# Patient Record
Sex: FEMALE | Race: WHITE | NOT HISPANIC OR LATINO | URBAN - METROPOLITAN AREA
[De-identification: names, ages, dates, MRNs, and addresses within clinical notes are randomized per-mention and may not be internally consistent; named-entity substitution may affect disease eponyms.]

---

## 2017-06-20 ENCOUNTER — INPATIENT (INPATIENT)
Facility: HOSPITAL | Age: 82
LOS: 5 days | Discharge: OTHER INSTITUTION | DRG: 539 | End: 2017-06-26
Attending: FAMILY MEDICINE | Admitting: FAMILY MEDICINE
Payer: MEDICARE

## 2017-06-20 VITALS
WEIGHT: 134.04 LBS | HEIGHT: 64 IN | HEART RATE: 91 BPM | OXYGEN SATURATION: 99 % | TEMPERATURE: 98 F | RESPIRATION RATE: 16 BRPM | DIASTOLIC BLOOD PRESSURE: 65 MMHG | SYSTOLIC BLOOD PRESSURE: 104 MMHG

## 2017-06-20 DIAGNOSIS — E11.9 TYPE 2 DIABETES MELLITUS WITHOUT COMPLICATIONS: ICD-10-CM

## 2017-06-20 DIAGNOSIS — Z90.721 ACQUIRED ABSENCE OF OVARIES, UNILATERAL: Chronic | ICD-10-CM

## 2017-06-20 DIAGNOSIS — Z96.7 PRESENCE OF OTHER BONE AND TENDON IMPLANTS: Chronic | ICD-10-CM

## 2017-06-20 DIAGNOSIS — N30.90 CYSTITIS, UNSPECIFIED WITHOUT HEMATURIA: ICD-10-CM

## 2017-06-20 DIAGNOSIS — G35 MULTIPLE SCLEROSIS: ICD-10-CM

## 2017-06-20 DIAGNOSIS — I10 ESSENTIAL (PRIMARY) HYPERTENSION: ICD-10-CM

## 2017-06-20 DIAGNOSIS — L98.499 NON-PRESSURE CHRONIC ULCER OF SKIN OF OTHER SITES WITH UNSPECIFIED SEVERITY: ICD-10-CM

## 2017-06-20 DIAGNOSIS — Z29.9 ENCOUNTER FOR PROPHYLACTIC MEASURES, UNSPECIFIED: ICD-10-CM

## 2017-06-20 DIAGNOSIS — E78.00 PURE HYPERCHOLESTEROLEMIA, UNSPECIFIED: ICD-10-CM

## 2017-06-20 DIAGNOSIS — E88.09 OTHER DISORDERS OF PLASMA-PROTEIN METABOLISM, NOT ELSEWHERE CLASSIFIED: ICD-10-CM

## 2017-06-20 DIAGNOSIS — F32.9 MAJOR DEPRESSIVE DISORDER, SINGLE EPISODE, UNSPECIFIED: ICD-10-CM

## 2017-06-20 LAB
ALBUMIN SERPL ELPH-MCNC: 2.8 G/DL — LOW (ref 3.3–5)
ALP SERPL-CCNC: 51 U/L — SIGNIFICANT CHANGE UP (ref 40–120)
ALT FLD-CCNC: 12 U/L — SIGNIFICANT CHANGE UP (ref 12–78)
AMYLASE P1 CFR SERPL: 49 U/L — SIGNIFICANT CHANGE UP (ref 25–115)
ANION GAP SERPL CALC-SCNC: 6 MMOL/L — SIGNIFICANT CHANGE UP (ref 5–17)
APPEARANCE UR: ABNORMAL
APTT BLD: 33.3 SEC — SIGNIFICANT CHANGE UP (ref 27.5–37.4)
AST SERPL-CCNC: 14 U/L — LOW (ref 15–37)
BASOPHILS # BLD AUTO: 0.1 K/UL — SIGNIFICANT CHANGE UP (ref 0–0.2)
BASOPHILS NFR BLD AUTO: 1.2 % — SIGNIFICANT CHANGE UP (ref 0–2)
BILIRUB SERPL-MCNC: 0.1 MG/DL — LOW (ref 0.2–1.2)
BILIRUB UR-MCNC: ABNORMAL
BUN SERPL-MCNC: 47 MG/DL — HIGH (ref 7–23)
CALCIUM SERPL-MCNC: 8.9 MG/DL — SIGNIFICANT CHANGE UP (ref 8.5–10.1)
CHLORIDE SERPL-SCNC: 109 MMOL/L — HIGH (ref 96–108)
CO2 SERPL-SCNC: 27 MMOL/L — SIGNIFICANT CHANGE UP (ref 22–31)
COLOR SPEC: YELLOW — SIGNIFICANT CHANGE UP
CREAT SERPL-MCNC: 0.85 MG/DL — SIGNIFICANT CHANGE UP (ref 0.5–1.3)
DIFF PNL FLD: ABNORMAL
EOSINOPHIL # BLD AUTO: 0.3 K/UL — SIGNIFICANT CHANGE UP (ref 0–0.5)
EOSINOPHIL NFR BLD AUTO: 3.7 % — SIGNIFICANT CHANGE UP (ref 0–6)
GLUCOSE SERPL-MCNC: 104 MG/DL — HIGH (ref 70–99)
GLUCOSE UR QL: NEGATIVE — SIGNIFICANT CHANGE UP
HCT VFR BLD CALC: 25.8 % — LOW (ref 34.5–45)
HGB BLD-MCNC: 8 G/DL — LOW (ref 11.5–15.5)
INR BLD: 1.08 RATIO — SIGNIFICANT CHANGE UP (ref 0.88–1.16)
KETONES UR-MCNC: NEGATIVE — SIGNIFICANT CHANGE UP
LACTATE SERPL-SCNC: 1 MMOL/L — SIGNIFICANT CHANGE UP (ref 0.7–2)
LEUKOCYTE ESTERASE UR-ACNC: ABNORMAL
LIDOCAIN IGE QN: 194 U/L — SIGNIFICANT CHANGE UP (ref 73–393)
LYMPHOCYTES # BLD AUTO: 1.3 K/UL — SIGNIFICANT CHANGE UP (ref 1–3.3)
LYMPHOCYTES # BLD AUTO: 14 % — SIGNIFICANT CHANGE UP (ref 13–44)
MCHC RBC-ENTMCNC: 31 GM/DL — LOW (ref 32–36)
MCHC RBC-ENTMCNC: 33.2 PG — SIGNIFICANT CHANGE UP (ref 27–34)
MCV RBC AUTO: 107.3 FL — HIGH (ref 80–100)
MONOCYTES # BLD AUTO: 0.1 K/UL — SIGNIFICANT CHANGE UP (ref 0–0.9)
MONOCYTES NFR BLD AUTO: 0.9 % — LOW (ref 1–9)
NEUTROPHILS # BLD AUTO: 7.2 K/UL — SIGNIFICANT CHANGE UP (ref 1.8–7.4)
NEUTROPHILS NFR BLD AUTO: 80.2 % — HIGH (ref 43–77)
NITRITE UR-MCNC: NEGATIVE — SIGNIFICANT CHANGE UP
PH UR: 9 — HIGH (ref 5–8)
PLATELET # BLD AUTO: 763 K/UL — HIGH (ref 150–400)
POTASSIUM SERPL-MCNC: 4.5 MMOL/L — SIGNIFICANT CHANGE UP (ref 3.5–5.3)
POTASSIUM SERPL-SCNC: 4.5 MMOL/L — SIGNIFICANT CHANGE UP (ref 3.5–5.3)
PROCALCITONIN SERPL-MCNC: 0.14 NG/ML — HIGH (ref 0–0.04)
PROT SERPL-MCNC: 6.5 G/DL — SIGNIFICANT CHANGE UP (ref 6–8.3)
PROT UR-MCNC: 75 MG/DL
PROTHROM AB SERPL-ACNC: 11.8 SEC — SIGNIFICANT CHANGE UP (ref 9.8–12.7)
RBC # BLD: 2.41 M/UL — LOW (ref 3.8–5.2)
RBC # FLD: 15.7 % — HIGH (ref 10.3–14.5)
SODIUM SERPL-SCNC: 142 MMOL/L — SIGNIFICANT CHANGE UP (ref 135–145)
SP GR SPEC: 1.01 — SIGNIFICANT CHANGE UP (ref 1.01–1.02)
UROBILINOGEN FLD QL: NEGATIVE — SIGNIFICANT CHANGE UP
WBC # BLD: 9 K/UL — SIGNIFICANT CHANGE UP (ref 3.8–10.5)
WBC # FLD AUTO: 9 K/UL — SIGNIFICANT CHANGE UP (ref 3.8–10.5)

## 2017-06-20 PROCEDURE — 71010: CPT | Mod: 26

## 2017-06-20 PROCEDURE — 99223 1ST HOSP IP/OBS HIGH 75: CPT | Mod: AI,GC

## 2017-06-20 PROCEDURE — 70450 CT HEAD/BRAIN W/O DYE: CPT | Mod: 26

## 2017-06-20 PROCEDURE — 74177 CT ABD & PELVIS W/CONTRAST: CPT | Mod: 26

## 2017-06-20 PROCEDURE — 99285 EMERGENCY DEPT VISIT HI MDM: CPT

## 2017-06-20 RX ORDER — VANCOMYCIN HCL 1 G
1000 VIAL (EA) INTRAVENOUS EVERY 12 HOURS
Qty: 0 | Refills: 0 | Status: DISCONTINUED | OUTPATIENT
Start: 2017-06-20 | End: 2017-06-21

## 2017-06-20 RX ORDER — SENNA PLUS 8.6 MG/1
2 TABLET ORAL AT BEDTIME
Qty: 0 | Refills: 0 | Status: DISCONTINUED | OUTPATIENT
Start: 2017-06-20 | End: 2017-06-26

## 2017-06-20 RX ORDER — PIPERACILLIN AND TAZOBACTAM 4; .5 G/20ML; G/20ML
3.38 INJECTION, POWDER, LYOPHILIZED, FOR SOLUTION INTRAVENOUS ONCE
Qty: 0 | Refills: 0 | Status: DISCONTINUED | OUTPATIENT
Start: 2017-06-20 | End: 2017-06-20

## 2017-06-20 RX ORDER — VENLAFAXINE HCL 75 MG
75 CAPSULE, EXT RELEASE 24 HR ORAL AT BEDTIME
Qty: 0 | Refills: 0 | Status: DISCONTINUED | OUTPATIENT
Start: 2017-06-20 | End: 2017-06-26

## 2017-06-20 RX ORDER — ENOXAPARIN SODIUM 100 MG/ML
40 INJECTION SUBCUTANEOUS DAILY
Qty: 0 | Refills: 0 | Status: DISCONTINUED | OUTPATIENT
Start: 2017-06-20 | End: 2017-06-26

## 2017-06-20 RX ORDER — SODIUM CHLORIDE 9 MG/ML
1000 INJECTION INTRAMUSCULAR; INTRAVENOUS; SUBCUTANEOUS
Qty: 0 | Refills: 0 | Status: DISCONTINUED | OUTPATIENT
Start: 2017-06-20 | End: 2017-06-23

## 2017-06-20 RX ORDER — METFORMIN HYDROCHLORIDE 850 MG/1
500 TABLET ORAL
Qty: 0 | Refills: 0 | Status: DISCONTINUED | OUTPATIENT
Start: 2017-06-20 | End: 2017-06-21

## 2017-06-20 RX ORDER — ACETAMINOPHEN 500 MG
650 TABLET ORAL EVERY 6 HOURS
Qty: 0 | Refills: 0 | Status: DISCONTINUED | OUTPATIENT
Start: 2017-06-20 | End: 2017-06-26

## 2017-06-20 RX ORDER — MORPHINE SULFATE 50 MG/1
2 CAPSULE, EXTENDED RELEASE ORAL EVERY 4 HOURS
Qty: 0 | Refills: 0 | Status: DISCONTINUED | OUTPATIENT
Start: 2017-06-20 | End: 2017-06-21

## 2017-06-20 RX ORDER — SODIUM CHLORIDE 9 MG/ML
1000 INJECTION INTRAMUSCULAR; INTRAVENOUS; SUBCUTANEOUS ONCE
Qty: 0 | Refills: 0 | Status: COMPLETED | OUTPATIENT
Start: 2017-06-20 | End: 2017-06-20

## 2017-06-20 RX ORDER — METFORMIN HYDROCHLORIDE 850 MG/1
500 TABLET ORAL EVERY 12 HOURS
Qty: 0 | Refills: 0 | Status: DISCONTINUED | OUTPATIENT
Start: 2017-06-20 | End: 2017-06-20

## 2017-06-20 RX ORDER — PIPERACILLIN AND TAZOBACTAM 4; .5 G/20ML; G/20ML
3.38 INJECTION, POWDER, LYOPHILIZED, FOR SOLUTION INTRAVENOUS EVERY 8 HOURS
Qty: 0 | Refills: 0 | Status: DISCONTINUED | OUTPATIENT
Start: 2017-06-21 | End: 2017-06-22

## 2017-06-20 RX ORDER — POLYETHYLENE GLYCOL 3350 17 G/17G
17 POWDER, FOR SOLUTION ORAL EVERY 24 HOURS
Qty: 0 | Refills: 0 | Status: DISCONTINUED | OUTPATIENT
Start: 2017-06-20 | End: 2017-06-26

## 2017-06-20 RX ORDER — METOPROLOL TARTRATE 50 MG
50 TABLET ORAL DAILY
Qty: 0 | Refills: 0 | Status: DISCONTINUED | OUTPATIENT
Start: 2017-06-20 | End: 2017-06-22

## 2017-06-20 RX ORDER — PIPERACILLIN AND TAZOBACTAM 4; .5 G/20ML; G/20ML
3.38 INJECTION, POWDER, LYOPHILIZED, FOR SOLUTION INTRAVENOUS ONCE
Qty: 0 | Refills: 0 | Status: COMPLETED | OUTPATIENT
Start: 2017-06-20 | End: 2017-06-20

## 2017-06-20 RX ORDER — VANCOMYCIN HCL 1 G
1000 VIAL (EA) INTRAVENOUS ONCE
Qty: 0 | Refills: 0 | Status: COMPLETED | OUTPATIENT
Start: 2017-06-20 | End: 2017-06-20

## 2017-06-20 RX ORDER — SODIUM CHLORIDE 9 MG/ML
3 INJECTION INTRAMUSCULAR; INTRAVENOUS; SUBCUTANEOUS ONCE
Qty: 0 | Refills: 0 | Status: COMPLETED | OUTPATIENT
Start: 2017-06-20 | End: 2017-06-20

## 2017-06-20 RX ORDER — ALPRAZOLAM 0.25 MG
0.5 TABLET ORAL ONCE
Qty: 0 | Refills: 0 | Status: DISCONTINUED | OUTPATIENT
Start: 2017-06-20 | End: 2017-06-21

## 2017-06-20 RX ORDER — HYDROMORPHONE HYDROCHLORIDE 2 MG/ML
1 INJECTION INTRAMUSCULAR; INTRAVENOUS; SUBCUTANEOUS EVERY 4 HOURS
Qty: 0 | Refills: 0 | Status: DISCONTINUED | OUTPATIENT
Start: 2017-06-20 | End: 2017-06-21

## 2017-06-20 RX ADMIN — SODIUM CHLORIDE 50 MILLILITER(S): 9 INJECTION INTRAMUSCULAR; INTRAVENOUS; SUBCUTANEOUS at 22:45

## 2017-06-20 RX ADMIN — Medication 75 MILLIGRAM(S): at 21:04

## 2017-06-20 RX ADMIN — Medication 250 MILLIGRAM(S): at 20:59

## 2017-06-20 RX ADMIN — MORPHINE SULFATE 2 MILLIGRAM(S): 50 CAPSULE, EXTENDED RELEASE ORAL at 22:49

## 2017-06-20 RX ADMIN — MORPHINE SULFATE 2 MILLIGRAM(S): 50 CAPSULE, EXTENDED RELEASE ORAL at 23:49

## 2017-06-20 RX ADMIN — SODIUM CHLORIDE 3 MILLILITER(S): 9 INJECTION INTRAMUSCULAR; INTRAVENOUS; SUBCUTANEOUS at 18:56

## 2017-06-20 RX ADMIN — Medication 50 MILLIGRAM(S): at 21:04

## 2017-06-20 RX ADMIN — SODIUM CHLORIDE 1000 MILLILITER(S): 9 INJECTION INTRAMUSCULAR; INTRAVENOUS; SUBCUTANEOUS at 19:02

## 2017-06-20 RX ADMIN — POLYETHYLENE GLYCOL 3350 17 GRAM(S): 17 POWDER, FOR SOLUTION ORAL at 21:04

## 2017-06-20 RX ADMIN — PIPERACILLIN AND TAZOBACTAM 200 GRAM(S): 4; .5 INJECTION, POWDER, LYOPHILIZED, FOR SOLUTION INTRAVENOUS at 19:02

## 2017-06-20 NOTE — H&P ADULT - RS GEN PE MLT RESP DETAILS PC
good air movement/no rales/clear to auscultation bilaterally/no wheezes/no rhonchi/normal/airway patent/breath sounds equal

## 2017-06-20 NOTE — H&P ADULT - ATTENDING COMMENTS
Pt was discharged from hospice care, however may need to re-eval for palliative care. Pt may require longterm IV abx if osteomyelitis confirmed.

## 2017-06-20 NOTE — ED ADULT NURSE NOTE - OBJECTIVE STATEMENT
pt states "I hurt all over" pt lethargic, spouse states this started last night. pt also with back pain.  pt with sacral wound with purulent drainage.

## 2017-06-20 NOTE — ED ADULT NURSE REASSESSMENT NOTE - NS ED NURSE REASSESS COMMENT FT1
Hill catheter change, F16 Hill catheter inserted connected to urine bag draining a cloudy yellow output. Patient tolerated procedure well.

## 2017-06-20 NOTE — ED PROVIDER NOTE - OBJECTIVE STATEMENT
Pt is a 85 yo female who presents to the ED with a cc of back pain.  Pt is lethargic at bedside and cannot provide history.  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  She has been non-ambulatory since 2003.  She was on home hospice at some point but was released from this program because her health appeared to improve.   receives services through another program at home.  He also reports that pt has a chronic wound to her sacrum and that visiting nurses come 3 times a week to provide dressing changes.  Last night pt began to c/o low back pain.  The pain continued throughout the night.  Early this morning around 7 am he gave her a Xanax which appeared to calm her down and she slept.  Pt then awoke again late morning early afternoon with back pain.  Her  again gave a Xanax and 2 extra strength Tylenol.  She slept for a few hours and then awoke again with severe pain.  EMS was called and pt was brought to the ED.

## 2017-06-20 NOTE — ED PROVIDER NOTE - FAMILY HISTORY
<<-----Click on this checkbox to enter Family History Family history of cancer in mother, unknown type     Family history of heart attack     Sibling  Still living? No  Family history of breast cancer in sister, Age at diagnosis: Age Unknown

## 2017-06-20 NOTE — ED PROVIDER NOTE - NEUROLOGICAL, MLM
Awake and lethargic not answering questions at this time,  atrophy noted to UE and LE with muscle wasting

## 2017-06-20 NOTE — ED PROVIDER NOTE - PSH
H/O abdominal hysterectomy    H/O oophorectomy    S/P ORIF (open reduction internal fixation) fracture  right lower leg

## 2017-06-20 NOTE — H&P ADULT - ASSESSMENT
87 y/o f pmhx depression, DM, HLD, HTN, MS, ulcer low back/pelvis presented to the ED with severe back pain admitted with sacral osteomyelitis.

## 2017-06-20 NOTE — ED PROVIDER NOTE - PMH
Depression    Diabetes mellitus    Hypercholesteremia    Hypertension    MS (multiple sclerosis)    Open wound of lower back and pelvis

## 2017-06-20 NOTE — H&P ADULT - SKIN COMMENTS
large sacral ulcer- unstagable with brown purulent discharge, raised red outer rim. L. ilium stage 1 ulcer.

## 2017-06-20 NOTE — H&P ADULT - HISTORY OF PRESENT ILLNESS
85 y/o f pmhx depression, DM, HLD, HTN, MS, ulcer low back/pelvis presented to the ED with severe back pain. Pt is lethargic and not able to speak,  at bedside.       Last night pt began to c/o low back pain. At 7am pts  gave her a Xanax which helped her to sleep.  Pt then awoke again late morning early afternoon with back pain.  Her  again gave a Xanax and 2 extra strength Tylenol.  She slept for a few hours and then awoke again with severe pain.  EMS was called and pt was brought to the ED. 85 y/o f pmhx depression, DM, HLD, HTN, MS, ulcer low back/pelvis presented to the ED with severe back pain. Pt is lethargic and not able to speak,  at bedside.       Last night pt began to c/o low back pain. At 7am pts  gave her a Xanax which helped her to sleep.  Pt then awoke again late morning early afternoon with back pain.  Her  again gave a Xanax and 2 extra strength Tylenol.  She slept for a few hours and then awoke again with severe pain.  EMS was called and pt was brought to the ED.    In the ED:  H/H: 8/25.8, BUN 47, albumin 2.8, procalcitonin .14, Lactate 1, UA: turbid, moderate LE, 11-25 WBC, pH 9, 3-5 RBC, TNTC bacteria.     CT head: No acute intracranial pathology.  CXR: no active disease    Pt given 1x dose IV Zosyn, 1L bolus NS, Xanax, 87 y/o f pmhx depression, DM, HLD, HTN, MS, ulcer low back/pelvis presented to the ED with severe back pain. Pt is lethargic and not able to speak,  at bedside.   awoke at midnight c/o back ache,  gave Xanax to pt and she was able to sleep the remainder of the night. When she woke up in the morning she began to complain of the pain again and was unable to find a comfortable position. He gave her another Xanax but this did not seem to help the pain this time.  states she has a "very bad bed wound" since January 2016. She has been seen by wound care and has had debridement of the wounds. She has also been in several nursing facilities until recently, pt was on hospice until 1 month ago.     In the ED:  H/H: 8/25.8, BUN 47, albumin 2.8, procalcitonin .14, Lactate 1, UA: turbid, moderate LE, 11-25 WBC, pH 9, 3-5 RBC, TNTC bacteria.     CT head: No acute intracranial pathology.  CXR: no active disease  CT abdomen/pelvis: Bladder appearance consistent with cystitis in the appropriate clinical setting. Correlate with symptomatology and urinalysis. Deep sacral ulcer possibly extending to bone. Osteomyelitis not excluded. Correlate with bone scan or MR.    Pt given 1x dose IV Zosyn, 1L bolus NS, Xanax, 85 y/o f pmhx depression, DM, HLD, HTN, MS, ulcer low back/pelvis presented to the ED with severe back pain. Pt is lethargic and not able to speak,  at bedside.   awoke at midnight c/o back ache,  gave Xanax to pt and she was able to sleep the remainder of the night. When she woke up in the morning she began to complain of the pain again and was unable to find a comfortable position. He gave her another Xanax but this did not seem to help the pain this time.  states she has a "very bad bed wound" since January 2016. She has been seen by wound care and has had debridement of the wounds. She has also been in several nursing facilities until recently, pt was on hospice until 1 month ago. Pt. did not want to answer any questions at time of exam. History obtained from .    In the ED:  H/H: 8/25.8, BUN 47, albumin 2.8, procalcitonin .14, Lactate 1, UA: turbid, moderate LE, 11-25 WBC, pH 9, 3-5 RBC, TNTC bacteria.     CT head: No acute intracranial pathology.  CXR: no active disease  CT abdomen/pelvis: Bladder appearance consistent with cystitis in the appropriate clinical setting. Correlate with symptomatology and urinalysis. Deep sacral ulcer possibly extending to bone. Osteomyelitis not excluded. Correlate with bone scan or MR.    Pt given 1x dose IV Zosyn, 1L bolus NS, Xanax,

## 2017-06-20 NOTE — ED PROVIDER NOTE - CARE PLAN
Principal Discharge DX:	Sacral ulcer, with unspecified severity  Instructions for follow-up, activity and diet:	admit  Secondary Diagnosis:	UTI (urinary tract infection)  Secondary Diagnosis:	Diabetes mellitus

## 2017-06-21 DIAGNOSIS — S31.000A UNSPECIFIED OPEN WOUND OF LOWER BACK AND PELVIS WITHOUT PENETRATION INTO RETROPERITONEUM, INITIAL ENCOUNTER: ICD-10-CM

## 2017-06-21 DIAGNOSIS — L98.493 NON-PRESSURE CHRONIC ULCER OF SKIN OF OTHER SITES WITH NECROSIS OF MUSCLE: ICD-10-CM

## 2017-06-21 DIAGNOSIS — D64.9 ANEMIA, UNSPECIFIED: ICD-10-CM

## 2017-06-21 LAB
ANION GAP SERPL CALC-SCNC: 6 MMOL/L — SIGNIFICANT CHANGE UP (ref 5–17)
BUN SERPL-MCNC: 41 MG/DL — HIGH (ref 7–23)
CALCIUM SERPL-MCNC: 8.3 MG/DL — LOW (ref 8.5–10.1)
CHLORIDE SERPL-SCNC: 107 MMOL/L — SIGNIFICANT CHANGE UP (ref 96–108)
CO2 SERPL-SCNC: 26 MMOL/L — SIGNIFICANT CHANGE UP (ref 22–31)
CREAT SERPL-MCNC: 0.85 MG/DL — SIGNIFICANT CHANGE UP (ref 0.5–1.3)
CULTURE RESULTS: SIGNIFICANT CHANGE UP
GLUCOSE SERPL-MCNC: 156 MG/DL — HIGH (ref 70–99)
HCT VFR BLD CALC: 23 % — LOW (ref 34.5–45)
HGB BLD-MCNC: 7.2 G/DL — LOW (ref 11.5–15.5)
MCHC RBC-ENTMCNC: 31.2 GM/DL — LOW (ref 32–36)
MCHC RBC-ENTMCNC: 33.4 PG — SIGNIFICANT CHANGE UP (ref 27–34)
MCV RBC AUTO: 107.1 FL — HIGH (ref 80–100)
PLATELET # BLD AUTO: 613 K/UL — HIGH (ref 150–400)
POTASSIUM SERPL-MCNC: 4.6 MMOL/L — SIGNIFICANT CHANGE UP (ref 3.5–5.3)
POTASSIUM SERPL-SCNC: 4.6 MMOL/L — SIGNIFICANT CHANGE UP (ref 3.5–5.3)
RBC # BLD: 2.14 M/UL — LOW (ref 3.8–5.2)
RBC # FLD: 15.6 % — HIGH (ref 10.3–14.5)
SODIUM SERPL-SCNC: 139 MMOL/L — SIGNIFICANT CHANGE UP (ref 135–145)
SPECIMEN SOURCE: SIGNIFICANT CHANGE UP
WBC # BLD: 10.8 K/UL — HIGH (ref 3.8–10.5)
WBC # FLD AUTO: 10.8 K/UL — HIGH (ref 3.8–10.5)

## 2017-06-21 PROCEDURE — 93010 ELECTROCARDIOGRAM REPORT: CPT

## 2017-06-21 PROCEDURE — 99233 SBSQ HOSP IP/OBS HIGH 50: CPT | Mod: GC

## 2017-06-21 RX ORDER — ALPRAZOLAM 0.25 MG
0.5 TABLET ORAL ONCE
Qty: 0 | Refills: 0 | Status: DISCONTINUED | OUTPATIENT
Start: 2017-06-21 | End: 2017-06-22

## 2017-06-21 RX ORDER — MORPHINE SULFATE 50 MG/1
2 CAPSULE, EXTENDED RELEASE ORAL EVERY 4 HOURS
Qty: 0 | Refills: 0 | Status: DISCONTINUED | OUTPATIENT
Start: 2017-06-21 | End: 2017-06-23

## 2017-06-21 RX ORDER — VANCOMYCIN HCL 1 G
1000 VIAL (EA) INTRAVENOUS EVERY 24 HOURS
Qty: 0 | Refills: 0 | Status: DISCONTINUED | OUTPATIENT
Start: 2017-06-22 | End: 2017-06-22

## 2017-06-21 RX ADMIN — HYDROMORPHONE HYDROCHLORIDE 1 MILLIGRAM(S): 2 INJECTION INTRAMUSCULAR; INTRAVENOUS; SUBCUTANEOUS at 02:58

## 2017-06-21 RX ADMIN — HYDROMORPHONE HYDROCHLORIDE 1 MILLIGRAM(S): 2 INJECTION INTRAMUSCULAR; INTRAVENOUS; SUBCUTANEOUS at 03:58

## 2017-06-21 RX ADMIN — MORPHINE SULFATE 2 MILLIGRAM(S): 50 CAPSULE, EXTENDED RELEASE ORAL at 22:58

## 2017-06-21 RX ADMIN — PIPERACILLIN AND TAZOBACTAM 25 GRAM(S): 4; .5 INJECTION, POWDER, LYOPHILIZED, FOR SOLUTION INTRAVENOUS at 18:59

## 2017-06-21 RX ADMIN — PIPERACILLIN AND TAZOBACTAM 25 GRAM(S): 4; .5 INJECTION, POWDER, LYOPHILIZED, FOR SOLUTION INTRAVENOUS at 10:52

## 2017-06-21 RX ADMIN — MORPHINE SULFATE 2 MILLIGRAM(S): 50 CAPSULE, EXTENDED RELEASE ORAL at 17:39

## 2017-06-21 RX ADMIN — ENOXAPARIN SODIUM 40 MILLIGRAM(S): 100 INJECTION SUBCUTANEOUS at 11:12

## 2017-06-21 RX ADMIN — MORPHINE SULFATE 2 MILLIGRAM(S): 50 CAPSULE, EXTENDED RELEASE ORAL at 08:34

## 2017-06-21 RX ADMIN — MORPHINE SULFATE 2 MILLIGRAM(S): 50 CAPSULE, EXTENDED RELEASE ORAL at 18:09

## 2017-06-21 RX ADMIN — MORPHINE SULFATE 2 MILLIGRAM(S): 50 CAPSULE, EXTENDED RELEASE ORAL at 08:04

## 2017-06-21 RX ADMIN — Medication 250 MILLIGRAM(S): at 05:14

## 2017-06-21 RX ADMIN — Medication 0.5 MILLIGRAM(S): at 08:04

## 2017-06-21 RX ADMIN — POLYETHYLENE GLYCOL 3350 17 GRAM(S): 17 POWDER, FOR SOLUTION ORAL at 19:53

## 2017-06-21 RX ADMIN — SODIUM CHLORIDE 50 MILLILITER(S): 9 INJECTION INTRAMUSCULAR; INTRAVENOUS; SUBCUTANEOUS at 19:58

## 2017-06-21 RX ADMIN — PIPERACILLIN AND TAZOBACTAM 25 GRAM(S): 4; .5 INJECTION, POWDER, LYOPHILIZED, FOR SOLUTION INTRAVENOUS at 03:18

## 2017-06-21 RX ADMIN — Medication 75 MILLIGRAM(S): at 21:58

## 2017-06-21 RX ADMIN — MORPHINE SULFATE 2 MILLIGRAM(S): 50 CAPSULE, EXTENDED RELEASE ORAL at 21:58

## 2017-06-21 NOTE — PROGRESS NOTE ADULT - SUBJECTIVE AND OBJECTIVE BOX
HPI:  85 y/o f pmhx depression, DM, HLD, HTN, MS, ulcer low back/pelvis presented to the ED with severe back pain. Pt was lethargic and not able to speak,  at bedside who stated pt awoke at midnight c/o back ache,  gave Xanax to pt and she was able to sleep the remainder of the night. When she woke up in the morning she began to complain of the pain again and was unable to find a comfortable position. He gave her another Xanax but this did not seem to help the pain this time.  states she has a "very bad bed wound" since 2016. She has been seen by wound care and has had debridement of the wounds. She has also been in several nursing facilities until recently, pt was on hospice until 1 month ago. Pt. did not want to answer any questions at time of exam. History obtained from . 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 In the ED: H/H: 8/25.8, BUN 47, albumin 2.8, procalcitonin .14, Lactate 1, UA: turbid, moderate LE, 11-25 WBC, pH 9, 3-5 RBC, TNTC bacteria. CT head: No acute intracranial pathology. CXR: no active disease. CT abdomen/pelvis: Bladder appearance consistent with cystitis in the appropriate clinical setting. Correlate with symptomatology and urinalysis. Deep sacral ulcer possibly extending to bone. Osteomyelitis not excluded. Correlate with bone scan or MR. Sore to bone, stage 4 - clinically osteomyelitis. Continue Zosyn (Day 2) and vanco (day 2) for sacral osteo.    REVIEW OF SYSTEMS:    CONSTITUTIONAL: + weakness, denies fevers or chills  EYES/ENT: No visual changes, no throat pain   RESPIRATORY: No cough, wheezing, hemoptysis; No shortness of breath  CARDIOVASCULAR: No chest pain or palpitations  GASTROINTESTINAL: No abdominal, nausea, vomiting, or hematemesis; No diarrhea or constipation. No melena or hematochezia.  GENITOURINARY: No dysuria, frequency or hematuria  NEUROLOGICAL: No dizziness, numbness, or weakness  SKIN: + pain on back where sore is, No itching, burning,  All other review of systems is negative unless indicated above.    Vital Signs Last 24 Hrs  T(C): 36.6, Max: 36.6 ( @ 09:42)  T(F): 97.8, Max: 97.8 ( @ 09:42)  HR: 78 (78 - 84)  BP: 117/69 (117/69 - 126/81)  BP(mean): --  RR: 15 (15 - 15)  SpO2: 96% (96% - 99%)  Wt(kg): --    PHYSICAL EXAM:     GENERAL: frail, cachectic female, no acute distress  HEENT: NC/AT, EOMI, neck supple, MMM  RESPIRATORY: decreased breath sounds b/l, no rhonchi, rales, or wheezing  CARDIOVASCULAR: RRR, no murmurs, gallops, rubs  ABDOMINAL: soft, non-tender, non-distended, positive bowel sounds   EXTREMITIES: no clubbing, cyanosis, or edema  NEUROLOGICAL: alert and oriented x 3, non-focal  SKIN: stage 4 pressure ulcer on sacrum with packing in place   MUSCULOSKELETAL: no gross joint deformity   - Chronic canseco    LABS:                        7.2    10.8  )-----------( 613      ( 2017 08:03 )             23.0         139  |  107  |  41<H>  ----------------------------<  156<H>  4.6   |  26  |  0.85    Ca    8.3<L>      2017 08:03    TPro  6.5  /  Alb  2.8<L>  /  TBili  0.1<L>  /  DBili  x   /  AST  14<L>  /  ALT  12  /  AlkPhos  51  06-20    PT/INR - ( 2017 17:35 )   PT: 11.8 sec;   INR: 1.08 ratio         PTT - ( 2017 17:35 )  PTT:33.3 sec  Urinalysis Basic - ( 2017 19:09 )    Color: Yellow / Appearance: Turbid / S.010 / pH: x  Gluc: x / Ketone: Negative  / Bili: Small / Urobili: Negative   Blood: x / Protein: 75 mg/dL / Nitrite: Negative   Leuk Esterase: Moderate / RBC: 3-5 /HPF / WBC 11-25   Sq Epi: x / Non Sq Epi: Occasional / Bacteria: TNTC      CAPILLARY BLOOD GLUCOSE  130 (2017 11:16)  161 (2017 08:45)      MEDICATIONS  (STANDING):  metoprolol succinate ER 50milliGRAM(s) Oral daily  venlafaxine 75milliGRAM(s) Oral at bedtime  polyethylene glycol 3350 17Gram(s) Oral every 24 hours  enoxaparin Injectable 40milliGRAM(s) SubCutaneous daily  sodium chloride 0.9%. 1000milliLiter(s) IV Continuous <Continuous>  vancomycin  IVPB 1000milliGRAM(s) IV Intermittent every 12 hours  piperacillin/tazobactam IVPB. 3.375Gram(s) IV Intermittent every 8 hours      Radiology:    CT head: No acute intracranial pathology.  CXR: no active disease  CT abdomen/pelvis: Bladder appearance consistent with cystitis in the appropriate clinical setting. Correlate with symptomatology and urinalysis. Deep sacral ulcer possibly extending to bone. Osteomyelitis not excluded. Correlate with bone scan or MR. HPI:  85 y/o f pmhx depression, DM, HLD, HTN, MS, ulcer low back/pelvis presented to the ED with severe back pain. Pt was lethargic and not able to speak,  at bedside who stated pt awoke at midnight c/o back ache,  gave Xanax to pt and she was able to sleep the remainder of the night. When she woke up in the morning she began to complain of the pain again and was unable to find a comfortable position. He gave her another Xanax but this did not seem to help the pain this time.  states she has a "very bad bed wound" since 2016. She has been seen by wound care and has had debridement of the wounds. She has also been in several nursing facilities until recently, pt was on hospice until 1 month ago. Pt. did not want to answer any questions at time of exam. History obtained from . 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 In the ED: H/H: 8/25.8, BUN 47, albumin 2.8, procalcitonin .14, Lactate 1, UA: turbid, moderate LE, 11-25 WBC, pH 9, 3-5 RBC, TNTC bacteria. CT head: No acute intracranial pathology. CXR: no active disease. CT abdomen/pelvis: Bladder appearance consistent with cystitis in the appropriate clinical setting. Correlate with symptomatology and urinalysis. Deep sacral ulcer possibly extending to bone. Osteomyelitis not excluded. Correlate with bone scan or MR. Sore to bone, stage 4 - clinically osteomyelitis. Continue Zosyn (Day 2) and vanco (day 2) for sacral osteo.    REVIEW OF SYSTEMS:    CONSTITUTIONAL: + weakness, denies fevers or chills  EYES/ENT: No visual changes, no throat pain   RESPIRATORY: No cough, wheezing, hemoptysis; No shortness of breath  CARDIOVASCULAR: No chest pain or palpitations  GASTROINTESTINAL: No abdominal, nausea, vomiting, or hematemesis; No diarrhea or constipation. No melena or hematochezia.  GENITOURINARY: No dysuria, frequency or hematuria  NEUROLOGICAL: No dizziness, numbness, or weakness  SKIN: + pain on back where sore is, No itching, burning,  All other review of systems is negative unless indicated above.    Vital Signs Last 24 Hrs  T(C): 36.6, Max: 36.6 ( @ 09:42)  T(F): 97.8, Max: 97.8 ( @ 09:42)  HR: 78 (78 - 84)  BP: 117/69 (117/69 - 126/81)  BP(mean): --  RR: 15 (15 - 15)  SpO2: 96% (96% - 99%)  Wt(kg): --    PHYSICAL EXAM:     GENERAL: frail, cachectic female, no acute distress  HEENT: NC/AT, EOMI, neck supple, MMM  RESPIRATORY: decreased breath sounds b/l, no rhonchi, rales, or wheezing  CARDIOVASCULAR: RRR, no murmurs, gallops, rubs  ABDOMINAL: soft, non-tender, non-distended, positive bowel sounds   EXTREMITIES: + 1 pitting edema in b/l legs, no clubbing, cyanosis  NEUROLOGICAL: alert and oriented x 3, non-focal  SKIN: stage 4 pressure ulcer on sacrum, bone visible  with packing in place   MUSCULOSKELETAL: no gross joint deformity   - Chronic canseco    LABS:                        7.2    10.8  )-----------( 613      ( 2017 08:03 )             23.0         139  |  107  |  41<H>  ----------------------------<  156<H>  4.6   |  26  |  0.85    Ca    8.3<L>      2017 08:03    TPro  6.5  /  Alb  2.8<L>  /  TBili  0.1<L>  /  DBili  x   /  AST  14<L>  /  ALT  12  /  AlkPhos  51  06-20    PT/INR - ( 2017 17:35 )   PT: 11.8 sec;   INR: 1.08 ratio         PTT - ( 2017 17:35 )  PTT:33.3 sec  Urinalysis Basic - ( 2017 19:09 )    Color: Yellow / Appearance: Turbid / S.010 / pH: x  Gluc: x / Ketone: Negative  / Bili: Small / Urobili: Negative   Blood: x / Protein: 75 mg/dL / Nitrite: Negative   Leuk Esterase: Moderate / RBC: 3-5 /HPF / WBC 11-25   Sq Epi: x / Non Sq Epi: Occasional / Bacteria: TNTC      CAPILLARY BLOOD GLUCOSE  130 (2017 11:16)  161 (2017 08:45)      MEDICATIONS  (STANDING):  metoprolol succinate ER 50milliGRAM(s) Oral daily  venlafaxine 75milliGRAM(s) Oral at bedtime  polyethylene glycol 3350 17Gram(s) Oral every 24 hours  enoxaparin Injectable 40milliGRAM(s) SubCutaneous daily  sodium chloride 0.9%. 1000milliLiter(s) IV Continuous <Continuous>  vancomycin  IVPB 1000milliGRAM(s) IV Intermittent every 12 hours  piperacillin/tazobactam IVPB. 3.375Gram(s) IV Intermittent every 8 hours      Radiology:    CT head: No acute intracranial pathology.  CXR: no active disease  CT abdomen/pelvis: Bladder appearance consistent with cystitis in the appropriate clinical setting. Correlate with symptomatology and urinalysis. Deep sacral ulcer possibly extending to bone. Osteomyelitis not excluded. Correlate with bone scan or MR. HPI:  87 y/o f pmhx depression, DM, HLD, HTN, MS, ulcer low back/pelvis presented to the ED with severe back pain. Pt was lethargic and not able to speak,  at bedside who stated pt awoke at midnight c/o back ache,  gave Xanax to pt and she was able to sleep the remainder of the night. When she woke up in the morning she began to complain of the pain again and was unable to find a comfortable position. He gave her another Xanax but this did not seem to help the pain this time.  states she has a "very bad bed wound" since 2016. She has been seen by wound care and has had debridement of the wounds. She has also been in several nursing facilities until recently, pt was on hospice until 1 month ago. Pt. did not want to answer any questions at time of exam. History obtained from . 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 In the ED: H/H: 8/25.8, BUN 47, albumin 2.8, procalcitonin .14, Lactate 1, UA: turbid, moderate LE, 11-25 WBC, pH 9, 3-5 RBC, TNTC bacteria. CT head: No acute intracranial pathology. CXR: no active disease. CT abdomen/pelvis: Bladder appearance consistent with cystitis in the appropriate clinical setting. Correlate with symptomatology and urinalysis. Deep sacral ulcer possibly extending to bone. Osteomyelitis not excluded. Correlate with bone scan or MRBandar Has stage IV ulcer (bone visible) - clinically osteomyelitis. Continue Zosyn (Day 2) and vanco (day 2) for ? sacral osteo.    REVIEW OF SYSTEMS:    CONSTITUTIONAL: + weakness, denies fevers or chills  EYES/ENT: No visual changes, no throat pain   RESPIRATORY: No cough, wheezing, hemoptysis; No shortness of breath  CARDIOVASCULAR: No chest pain or palpitations  GASTROINTESTINAL: No abdominal, nausea, vomiting, or hematemesis; No diarrhea or constipation. No melena or hematochezia.  GENITOURINARY: No dysuria, frequency or hematuria  NEUROLOGICAL: No dizziness, numbness, or weakness  SKIN: + pain on back where sore is, No itching, burning,  All other review of systems is negative unless indicated above.    Vital Signs Last 24 Hrs  T(C): 36.6, Max: 36.6 ( @ 09:42)  T(F): 97.8, Max: 97.8 ( @ 09:42)  HR: 78 (78 - 84)  BP: 117/69 (117/69 - 126/81)  BP(mean): --  RR: 15 (15 - 15)  SpO2: 96% (96% - 99%)  Wt(kg): --    PHYSICAL EXAM:     GENERAL: frail, cachectic female, no acute distress  HEENT: NC/AT, EOMI, neck supple, MMM  RESPIRATORY: decreased breath sounds b/l, no rhonchi, rales, or wheezing  CARDIOVASCULAR: RRR, no murmurs, gallops, rubs  ABDOMINAL: soft, non-tender, non-distended, positive bowel sounds   EXTREMITIES: + 1 pitting edema in b/l legs, no clubbing, cyanosis  NEUROLOGICAL: alert and oriented x 3, non-focal  SKIN: stage 4 pressure ulcer on sacrum, bone visible  with packing in place   MUSCULOSKELETAL: no gross joint deformity   - Chronic canseco    LABS:                        7.2    10.8  )-----------( 613      ( 2017 08:03 )             23.0         139  |  107  |  41<H>  ----------------------------<  156<H>  4.6   |  26  |  0.85    Ca    8.3<L>      2017 08:03    TPro  6.5  /  Alb  2.8<L>  /  TBili  0.1<L>  /  DBili  x   /  AST  14<L>  /  ALT  12  /  AlkPhos  51  06-20    PT/INR - ( 2017 17:35 )   PT: 11.8 sec;   INR: 1.08 ratio         PTT - ( 2017 17:35 )  PTT:33.3 sec  Urinalysis Basic - ( 2017 19:09 )    Color: Yellow / Appearance: Turbid / S.010 / pH: x  Gluc: x / Ketone: Negative  / Bili: Small / Urobili: Negative   Blood: x / Protein: 75 mg/dL / Nitrite: Negative   Leuk Esterase: Moderate / RBC: 3-5 /HPF / WBC 11-25   Sq Epi: x / Non Sq Epi: Occasional / Bacteria: TNTC      CAPILLARY BLOOD GLUCOSE  130 (2017 11:16)  161 (2017 08:45)      MEDICATIONS  (STANDING):  metoprolol succinate ER 50milliGRAM(s) Oral daily  venlafaxine 75milliGRAM(s) Oral at bedtime  polyethylene glycol 3350 17Gram(s) Oral every 24 hours  enoxaparin Injectable 40milliGRAM(s) SubCutaneous daily  sodium chloride 0.9%. 1000milliLiter(s) IV Continuous <Continuous>  vancomycin  IVPB 1000milliGRAM(s) IV Intermittent every 12 hours  piperacillin/tazobactam IVPB. 3.375Gram(s) IV Intermittent every 8 hours      Radiology:    CT head: No acute intracranial pathology.  CXR: no active disease  CT abdomen/pelvis: Bladder appearance consistent with cystitis in the appropriate clinical setting. Correlate with symptomatology and urinalysis. Deep sacral ulcer possibly extending to bone. Osteomyelitis not excluded. Correlate with bone scan or MR.

## 2017-06-21 NOTE — CONSULT NOTE ADULT - SUBJECTIVE AND OBJECTIVE BOX
Chief Complaint: sacral ulcer    HPI: 87 y/o white female with long history of sacral ulcer that was surgically closed in May 2016 only to be disrupted post op. Patient presented with lower back pain. CT of pelvis shows ulcer down to bone and possible osteomyelitis.    PAST MEDICAL & SURGICAL HISTORY:  Open wound of lower back and pelvis  Depression  Hypercholesteremia  Hypertension  Diabetes mellitus  MS (multiple sclerosis)  S/P ORIF (open reduction internal fixation) fracture: right lower leg  H/O oophorectomy  H/O abdominal hysterectomy      Allergies    No Known Allergies    Intolerances        MEDICATIONS  (STANDING):  metoprolol succinate ER 50milliGRAM(s) Oral daily  venlafaxine 75milliGRAM(s) Oral at bedtime  polyethylene glycol 3350 17Gram(s) Oral every 24 hours  enoxaparin Injectable 40milliGRAM(s) SubCutaneous daily  sodium chloride 0.9%. 1000milliLiter(s) IV Continuous <Continuous>  vancomycin  IVPB 1000milliGRAM(s) IV Intermittent every 12 hours  piperacillin/tazobactam IVPB. 3.375Gram(s) IV Intermittent every 8 hours    MEDICATIONS  (PRN):  senna 2Tablet(s) Oral at bedtime PRN Constipation  acetaminophen   Tablet. 650milliGRAM(s) Oral every 6 hours PRN Mild Pain  morphine  - Injectable 2milliGRAM(s) IV Push every 4 hours PRN Moderate Pain (4 - 6)  HYDROmorphone  Injectable 1milliGRAM(s) IV Push every 4 hours PRN Severe Pain (7 - 10)      FAMILY HISTORY:  Family history of breast cancer in sister (Sibling)  Family history of heart attack  Family history of cancer in mother: unknown type  No pertinent family history          ROS:  CONSTITUTIONAL: No fever, weight loss, or fatigue  EYES: No eye pain, visual disturbances, or discharge  ENMT:  No difficulty hearing, tinnitus, vertigo; No sinus or throat pain  NECK: No pain or stiffness  BREASTS: No pain, masses, or nipple discharge  RESPIRATORY: No cough, wheezing, chills or hemoptysis; No shortness of breath  CARDIOVASCULAR: No chest pain, palpitations, dizziness, or leg swelling  GASTROINTESTINAL: No abdominal or epigastric pain. No nausea, vomiting, or hematemesis; No diarrhea or constipation. No melena or hematochezia.  GENITOURINARY: No dysuria, frequency, hematuria, or incontinence  NEUROLOGICAL: No headaches, memory loss, loss of strength, numbness, or tremors  SKIN: No itching, burning, rashes, or lesions   LYMPH NODES: No enlarged glands  ENDOCRINE: No heat or cold intolerance; No hair loss  MUSCULOSKELETAL: No joint pain or swelling; No muscle, back, or extremity pain  PSYCHIATRIC: No depression, anxiety, mood swings, or difficulty sleeping  HEME/LYMPH: No easy bruising, or bleeding gums  ALLERGY AND IMMUNOLOGIC: No hives or eczema    PHYSICAL EXAM-    Height (cm): 162.6 (06-20 @ 16:37)  Weight (kg): 60.8 (06-20 @ 16:37)  BMI (kg/m2): 23 (06-20 @ 16:37)  Vital Signs Last 24 Hrs  T(C): 36.6, Max: 36.6 (06-20 @ 21:39)  T(F): 97.8, Max: 97.9 (06-20 @ 21:39)  HR: 78 (78 - 91)  BP: 117/69 (104/65 - 130/73)  BP(mean): --  RR: 15 (14 - 16)  SpO2: 96% (96% - 99%)    Constitutional: well developed, well nourished, no apparent distress, alert, oriented x 3.   Pulmonary: no respiratory distress, normal respiratory rhythm and effort, lungs are clear to auscultation/percussion. No CVA tenderness.  Cardiovascular: heart rate normal, normal sinus rhythm; no murmurs, gallops, rubs, heaves or thrills   Abdomen: soft, non-tender, +BS, no guarding/rebound/rigidity.  Vascular:   ENMT:  Neck:  Extremites:  Skin:sacral/coccyx ulcer to bone. 7cm x 5cm x1.1 cm with clean granulating base. Little non viable tissue. Good granulating bed.                            7.2    10.8  )-----------( 613      ( 21 Jun 2017 08:03 )             23.0     06-21    139  |  107  |  41<H>  ----------------------------<  156<H>  4.6   |  26  |  0.85    Ca    8.3<L>      21 Jun 2017 08:03    TPro  6.5  /  Alb  2.8<L>  /  TBili  0.1<L>  /  DBili  x   /  AST  14<L>  /  ALT  12  /  AlkPhos  51  06-20      Radiology      Impression:      Recommendations:

## 2017-06-21 NOTE — PROGRESS NOTE ADULT - PROBLEM SELECTOR PLAN 6
-Diet controlled d/c metformin due to possible risk of DERECK, will monitor sugars with accu checks and decide if ISS is necessary

## 2017-06-21 NOTE — CONSULT NOTE ADULT - PROBLEM SELECTOR RECOMMENDATION 9
concerning for active infectious process  recommend antibiotics now pending further clarification of clinical picture. Will be a challenging distinction between osteomyelitis and cellulitis.  Unclear if this or urinary source driving septic picture
Silver alginate rope packing changed every other day  VAC   specialty bed

## 2017-06-21 NOTE — CONSULT NOTE ADULT - SUBJECTIVE AND OBJECTIVE BOX
HPI:  85 y/o f pmhx DM, HLD, HTN, MS, sacral ulcer and pelvis presented to the ED with severe back pain.    Has long standing chronic sacral ulcer which was surgically closed in may 2016 which then dehiesced.  She has been seen by wound care and has had debridement of the wounds. She has also been in/out  several nursing facilities until recently, pt was on hospice until 1 month ago. Denies fever chills n/v. No   diarrhea. Afebrile. In ER afebrile. Had CT A/P which showed deep ulcer extending to bone. Also showed   cystitis. UA positive. Pt mostly bed bound.    Infectious Disease consult was called to evaluate pt and for antibiotic management.       PAST MEDICAL & SURGICAL HISTORY:  Open wound of lower back and pelvis  Depression  Hypercholesteremia  Hypertension  Diabetes mellitus  MS (multiple sclerosis)  S/P ORIF (open reduction internal fixation) fracture: right lower leg  H/O oophorectomy  H/O abdominal hysterectomy    Home Medications:   * Patient Currently Takes Medications as of 2016 14:05 documented in Order   · 	venlafaxine 37.5 mg oral tablet: 2 tab(s) orally once a day (at bedtime)  · 	heparin: 5000 unit(s) subcutaneous every 12 hours  · 	acetaminophen 325 mg oral tablet: 2 tab(s) orally every 6 hours, As needed, Mild Pain  · 	simvastatin 20 mg oral tablet: 1 tab(s) orally once a day (at bedtime)  · 	collagenase 250 units/g topical ointment: 1 application topically once a day  · 	ascorbic acid 500 mg oral tablet: 1 tab(s) orally once a day  · 	zinc sulfate 220 mg oral capsule: 1 cap(s) orally once a day  · 	senna oral tablet: 2 tab(s) orally once a day (at bedtime), As needed, Constipation  · 	polyethylene glycol 3350 oral powder for reconstitution: 17 gram(s) orally 2 times a day  · 	multivitamin:   once a day  · 	Slow Fe:  orally once a day  · 	Metoprolol Succinate ER 50 mg oral tablet, extended release: 1 tab(s) orally once a day  · 	alendronate 70 mg oral tablet: 1 tab(s) orally once a week  · 	metFORMIN 500 mg oral tablet, extended release:  orally 2 times a day  · 	ALPRAZolam 0.5 mg oral tablet:  orally once a day, As Needed  · 	Diovan 160 mg oral tablet: 1 tab(s) orally once a day    Allergies  No Known Allergies    MEDICATIONS  (STANDING):  metoprolol succinate ER 50milliGRAM(s) Oral daily  venlafaxine 75milliGRAM(s) Oral at bedtime  polyethylene glycol 3350 17Gram(s) Oral every 24 hours  enoxaparin Injectable 40milliGRAM(s) SubCutaneous daily  sodium chloride 0.9%. 1000milliLiter(s) IV Continuous <Continuous>  piperacillin/tazobactam IVPB. 3.375Gram(s) IV Intermittent every 8 hours    MEDICATIONS  (PRN):  senna 2Tablet(s) Oral at bedtime PRN Constipation  acetaminophen   Tablet. 650milliGRAM(s) Oral every 6 hours PRN Mild Pain  morphine  - Injectable 2milliGRAM(s) IV Push every 4 hours PRN Severe Pain (7 - 10)  oxyCODONE  5 mG/acetaminophen 325 mG 1Tablet(s) Oral every 4 hours PRN Moderate Pain (4 - 6)      ANTIBIOTICS  piperacillin/tazobactam IVPB. 3.375Gram(s) IV Intermittent every 8 hours      SOCIAL HISTORY:  Lives with   Does not smoke  Does not drink    FAMILY HISTORY:  Family history of breast cancer in sister (Sibling)  Family history of heart attack  Family history of cancer in mother: unknown type  No pertinent family history        Drug Dosing Weight  Height (cm): 162.6 (2017 16:37)  Weight (kg): 60.8 (2017 16:37)  BMI (kg/m2): 23 (2017 16:37)  BSA (m2): 1.65 (2017 16:37)      REVIEW OF SYSTEMS:    CONSTITUTIONAL:  As per HPI. No fever    HEENT:  Eyes:  No diplopia or blurred vision. ENT:  No earache, sore throat or runny nose.    CARDIOVASCULAR:  No pressure, squeezing, strangling, tightness, heaviness or aching about the chest, neck, axilla or epigastrium.    RESPIRATORY:  No cough, shortness of breath, PND or orthopnea.    GASTROINTESTINAL:  No nausea, vomiting or diarrhea.    GENITOURINARY:  No dysuria, frequency or urgency.    MUSCULOSKELETAL:  As per HPI.    SKIN:  No change in skin, hair or nails.    NEUROLOGIC:  No paresthesias, fasciculations, seizures +weakness.    HEMATOLOGICAL:  No easy bruising or bleeding.       Vital Signs Last 24 Hrs  T(C): 36.8, Max: 36.8 ( @ 14:30)  T(F): 98.3, Max: 98.3 ( @ 14:30)  HR: 96 (78 - 96)  BP: 110/65 (110/65 - 130/73)  BP(mean): --  RR: 17 (14 - 17)  SpO2: 97% (96% - 99%)    PHYSICAL EXAMINATION:    GENERAL: The patient is awake chronically ill looking No distress    HEENT: Head is normocephalic and atraumatic. Extraocular muscles are intact. Pupils are equal, round, and reactive to light and accommodation. Nares appeared normal. Mouth is well hydrated and without lesions.     NECK: Supple. No carotid bruits.  No lymphadenopathy or thyromegaly.    LUNGS: Clear to auscultation.    HEART: Regular rate and rhythm without murmur.    ABDOMEN: Soft, nontender, and nondistended.  Positive bowel sounds.  Wound vac to right groin and back    BACK: Stage 4 sacral decub no surrounding erythema no purulent discharge no foul smell    EXTREMITIES: Without any cyanosis, clubbing, rash, lesions or edema.    NEUROLOGIC: Cranial nerves II through XII are grossly intact.    SKIN: No ulceration or induration present.    MICROBIOLOGY:      LABS:                        7.2    10.8  )-----------( 613      ( 2017 08:03 )             23.0     -    139  |  107  |  41<H>  ----------------------------<  156<H>  4.6   |  26  |  0.85    Ca    8.3<L>      2017 08:03    TPro  6.5  /  Alb  2.8<L>  /  TBili  0.1<L>  /  DBili  x   /  AST  14<L>  /  ALT  12  /  AlkPhos  51  -20    PT/INR - ( 2017 17:35 )   PT: 11.8 sec;   INR: 1.08 ratio         PTT - ( 2017 17:35 )  PTT:33.3 sec  Urinalysis Basic - ( 2017 19:09 )    Color: Yellow / Appearance: Turbid / S.010 / pH: x  Gluc: x / Ketone: Negative  / Bili: Small / Urobili: Negative   Blood: x / Protein: 75 mg/dL / Nitrite: Negative   Leuk Esterase: Moderate / RBC: 3-5 /HPF / WBC 11-25   Sq Epi: x / Non Sq Epi: Occasional / Bacteria: TNTC        RADIOLOGY & ADDITIONAL STUDIES:      EXAM:  CT ABDOMEN AND PELVIS IC                            PROCEDURE DATE:  2017        INTERPRETATION:  History: Sacral wound possible infection.    CT abdomen and pelvis. Prior 2016.    95 cc Omnipaque 350 injected intravenously.    Image degradation secondary to beam hardening artifact.  Clear lung bases.  Gallbladder liver pancreas spleen not remarkable. There is a 7 mm rim   calcified splenic artery aneurysm similar to prior. No adrenal nodules.   Bilateral renal cysts. Right extrarenal pelvis, no adilson hydronephrosis.  Nonaneurysmal aorta.  No adenopathy.  No obstructed or inflamed bowel.  Hill catheter noted within the bladder. Again noted is a thick-walled   trabeculated bladder with diverticula. Mucosal enhancement and   perivesical stranding suggests cystitis in the appropriate clinical   setting. Status post hysterectomy.  Subcutaneous stranding in the gluteal regions nonspecific decreased   compared to prior may represent anasarca. Correlate for possible   cellulitis. No drainable collection.  There is a deep sacral ulcer possibly extending down to bone.   Osteomyelitis not excluded. Correlate with bone scan or MRI.    Impression:  Bladder appearance consistent with cystitis in the appropriate clinical   setting. Correlate with symptomatology and urinalysis.  Deep sacral ulcer possibly extending to bone. Osteomyelitis not excluded.   Correlate with bone scan or MR.  additional findings as discussed.        EXAM:  PORTABLE CHEST URGENT                            PROCEDURE DATE:  2017        INTERPRETATION:  Fatigue, lethargy.    AP chest. Prior 5/3/2016.    No change heart mediastinum. Interval clearing at the left base. No   consolidation or effusion.    Impression: No active disease.        ASSESSMENT:  86 year old female Advanced MS bed bound hx of sacral decub admitted with back pain  ?sec UTI/pyelo  Sacral ulcer though deep is not looking infected  Weak and deconditioned      PLAN:  Cont Vancomycin Zosyn  Fu cultures  Cont wound care recommendations  Off load

## 2017-06-21 NOTE — CONSULT NOTE ADULT - ASSESSMENT
87 yo female admitted with chronic sacral wound, abn UA and significant decline acutely in mental status

## 2017-06-21 NOTE — CONSULT NOTE ADULT - PROBLEM SELECTOR RECOMMENDATION 3
recommend good but not tight control    Thank you for consulting us and involving us in the management of this most interesting and challenging case.     We will follow along in the care of this patient.
as above

## 2017-06-21 NOTE — PROGRESS NOTE ADULT - PROBLEM SELECTOR PLAN 5
d/c metformin due to possible risk of DERECK, will monitor sugars with accu checks and decide if ISS is necessary - h/h 7.2/ 23.0 - stable as baseline as of july 2016 it was similar at 7.3/23.1  will monitor

## 2017-06-21 NOTE — PROGRESS NOTE ADULT - PROBLEM SELECTOR PLAN 1
-F/u wound culture  -ID consulted - f/u reccs  -Wound care consulted - f/u reccs  -Continue Vanco/Zosyn day 2  -Pain control with tylenol for mild, morphine 2mg q5 for moderate, dilaudid 1mg q 4 for severe,   -Rotate in bed frequently  -If true osteomyelitis will need PICC line - CT abd/pelvis - Deep sacral ulcer possibly extending to bone. Osteomyelitis not excluded. Correlate with bone scan or MR.   - Clinically it is osteo as bone is visible - stage 4   -F/u wound culture  - wbc increased to 10.8   -ID consulted - f/u reccs  -Wound care consulted - f/u reccs  -Continue Vanco/Zosyn day 2  -Pain control with tylenol for mild, morphine 2mg q5 for moderate, dilaudid 1mg q 4 for severe,   -Rotate in bed frequently  -If true osteomyelitis will need PICC line - CT abd/pelvis - Deep sacral ulcer possibly extending to bone. Osteomyelitis not excluded. Correlate with bone scan or MR.   - Clinically it is osteo as bone is visible - stage 4   -F/u wound culture  - wbc increased to 10.8   -ID consulted - f/u reccs  -Wound care consulted - f/u reccs  -Continue Vanco/Zosyn day 2  -Pain control with tylenol for mild, morphine 2mg q5 for moderate, dilaudid 1mg q 4 for severe,   -Rotate in bed frequently

## 2017-06-21 NOTE — CONSULT NOTE ADULT - SUBJECTIVE AND OBJECTIVE BOX
HPI:  87 y/o f pmhx depression, DM, HLD, HTN, MS, ulcer low back/pelvis presented to the ED with severe back pain. Pt is lethargic and not able to speak,  at bedside.   awoke at midnight c/o back ache,  gave Xanax to pt and she was able to sleep the remainder of the night. When she woke up in the morning she began to complain of the pain again and was unable to find a comfortable position. He gave her another Xanax but this did not seem to help the pain this time.  states she has a "very bad bed wound" since 2016. She has been seen by wound care and has had debridement of the wounds. She has also been in several nursing facilities until recently, pt was on hospice until 1 month ago. Pt. did not want to answer any questions at time of exam. History obtained from .    In the ED:  H/H: 8/25.8, BUN 47, albumin 2.8, procalcitonin .14, Lactate 1, UA: turbid, moderate LE, 11-25 WBC, pH 9, 3-5 RBC, TNTC bacteria.     When I see the patient she is cooperative, verbal and a good informant. Reporting not feeling well and declined recently. Reports she lives in her home in [place] and her  does a very good job of caring for her.    CT head: No acute intracranial pathology.  CXR: no active disease  CT abdomen/pelvis: Bladder appearance consistent with cystitis in the appropriate clinical setting. Correlate with symptomatology and urinalysis. Deep sacral ulcer possibly extending to bone. Osteomyelitis not excluded. Correlate with bone scan or MR.    Pt given 1x dose IV Zosyn, 1L bolus NS, Xanax, (2017 19:50)      PAST MEDICAL & SURGICAL HISTORY:  Open wound of lower back and pelvis  Depression  Hypercholesteremia  Hypertension  Diabetes mellitus  MS (multiple sclerosis)  S/P ORIF (open reduction internal fixation) fracture: right lower leg  H/O oophorectomy  H/O abdominal hysterectomy      Antimicrobials  piperacillin/tazobactam IVPB. 3.375Gram(s) IV Intermittent every 8 hours      Immunological      Other  metoprolol succinate ER 50milliGRAM(s) Oral daily  venlafaxine 75milliGRAM(s) Oral at bedtime  polyethylene glycol 3350 17Gram(s) Oral every 24 hours  senna 2Tablet(s) Oral at bedtime PRN  acetaminophen   Tablet. 650milliGRAM(s) Oral every 6 hours PRN  enoxaparin Injectable 40milliGRAM(s) SubCutaneous daily  sodium chloride 0.9%. 1000milliLiter(s) IV Continuous <Continuous>  morphine  - Injectable 2milliGRAM(s) IV Push every 4 hours PRN  oxyCODONE  5 mG/acetaminophen 325 mG 1Tablet(s) Oral every 4 hours PRN      Allergies    No Known Allergies    Intolerances        SOCIAL HISTORY: no current tobacco    FAMILY HISTORY:  Family history of breast cancer in sister (Sibling)  Family history of heart attack  Family history of cancer in mother: unknown type  No pertinent family history      ROS:    EYES:  Negative  blurry vision or double vision  GASTROINTESTINAL:  Negative for nausea, vomiting, diarrhea  -otherwise negative except for subjective    Vital Signs Last 24 Hrs  T(C): 36.8, Max: 36.8 ( @ 14:30)  T(F): 98.3, Max: 98.3 ( @ 14:30)  HR: 96 (78 - 96)  BP: 110/65 (110/65 - 126/81)  BP(mean): --  RR: 17 (14 - 17)  SpO2: 97% (96% - 99%)    PE:  WDWN in no distress  HEENT:  NC, PERRL, sclerae anicteric, conjunctivae clear, EOMI.  Sinuses nontender, no nasal exudate.  No buccal or pharyngeal lesions, erythema or exudate  Neck:  Supple, no adenopathy  Lungs:  No accessory muscle use, bilaterally clear to auscultation  Cor:  RRR, S1, S2, no murmur appreciated  Abd:  Symmetric, normoactive BS.  Soft, nontender, no masses, guarding or rebound.  Liver and spleen not enlarged  Extrem:  No cyanosis or edema  Skin:  wound vac on rt hib extending into the sacral area with surrounding erythema and induration in sacral region  Neuro: grossly intact, patient oriented and appropriate  Musc: moving all limbs freely, no focal deficits, a little difficult to examine and requiring assistance    LABS:                        7.2    10.8  )-----------( 613      ( 2017 08:03 )             23.0     06-21    139  |  107  |  41<H>  ----------------------------<  156<H>  4.6   |  26  |  0.85    Ca    8.3<L>      2017 08:03    TPro  6.5  /  Alb  2.8<L>  /  TBili  0.1<L>  /  DBili  x   /  AST  14<L>  /  ALT  12  /  AlkPhos  51  -    Urinalysis Basic - ( 2017 19:09 )    Color: Yellow / Appearance: Turbid / S.010 / pH: x  Gluc: x / Ketone: Negative  / Bili: Small / Urobili: Negative   Blood: x / Protein: 75 mg/dL / Nitrite: Negative   Leuk Esterase: Moderate / RBC: 3-5 /HPF / WBC 11-25   Sq Epi: x / Non Sq Epi: Occasional / Bacteria: TNTC        MICROBIOLOGY:  Culture Results:   Culture grew 3 or more types of organisms which indicate  collection contamination; consider recollection only if clinically  indicated. ( @ 22:18)      RADIOLOGY & ADDITIONAL STUDIES:    --EXAM:  CT ABDOMEN AND PELVIS IC                            PROCEDURE DATE:  2017        INTERPRETATION:  History: Sacral wound possible infection.    CT abdomen and pelvis. Prior 2016.    95 cc Omnipaque 350 injected intravenously.    Image degradation secondary to beam hardening artifact.  Clear lung bases.  Gallbladder liver pancreas spleen not remarkable. There is a 7 mm rim   calcified splenic artery aneurysm similar to prior. No adrenal nodules.   Bilateral renal cysts. Right extrarenal pelvis, no adilson hydronephrosis.  Nonaneurysmal aorta.  No adenopathy.  No obstructed or inflamed bowel.  Hill catheter noted within the bladder. Again noted is a thick-walled   trabeculated bladder with diverticula. Mucosal enhancement and   perivesical stranding suggests cystitis in the appropriate clinical   setting. Status post hysterectomy.  Subcutaneous stranding in the gluteal regions nonspecific decreased   compared to prior may represent anasarca. Correlate for possible   cellulitis. No drainable collection.  There is a deep sacral ulcer possibly extending down to bone.   Osteomyelitis not excluded. Correlate with bone scan or MRI.    Impression:  Bladder appearance consistent with cystitis in the appropriate clinical   setting. Correlate with symptomatology and urinalysis.  Deep sacral ulcer possibly extending to bone. Osteomyelitis not excluded.   Correlate with bone scan or MR.  additional findings as discussed.

## 2017-06-21 NOTE — PROGRESS NOTE ADULT - PROBLEM SELECTOR PLAN 3
-Continue antibiotics as per vanco and zosyn for sacral osteo -Continue antibiotics as per vanco and zosyn for sacral osteo  - pt has chronic canseco -Continue antibiotics (vanco and zosyn) for sacral osteo  - pt has chronic canseco

## 2017-06-22 LAB
ANION GAP SERPL CALC-SCNC: 7 MMOL/L — SIGNIFICANT CHANGE UP (ref 5–17)
BUN SERPL-MCNC: 42 MG/DL — HIGH (ref 7–23)
CALCIUM SERPL-MCNC: 7.9 MG/DL — LOW (ref 8.5–10.1)
CHLORIDE SERPL-SCNC: 108 MMOL/L — SIGNIFICANT CHANGE UP (ref 96–108)
CO2 SERPL-SCNC: 25 MMOL/L — SIGNIFICANT CHANGE UP (ref 22–31)
CREAT SERPL-MCNC: 0.96 MG/DL — SIGNIFICANT CHANGE UP (ref 0.5–1.3)
GLUCOSE SERPL-MCNC: 121 MG/DL — HIGH (ref 70–99)
HCT VFR BLD CALC: 19.9 % — CRITICAL LOW (ref 34.5–45)
HCT VFR BLD CALC: 20.2 % — CRITICAL LOW (ref 34.5–45)
HCT VFR BLD CALC: 25.7 % — LOW (ref 34.5–45)
HGB BLD-MCNC: 6.2 G/DL — CRITICAL LOW (ref 11.5–15.5)
HGB BLD-MCNC: 6.4 G/DL — CRITICAL LOW (ref 11.5–15.5)
HGB BLD-MCNC: 8.5 G/DL — LOW (ref 11.5–15.5)
MCHC RBC-ENTMCNC: 31.2 GM/DL — LOW (ref 32–36)
MCHC RBC-ENTMCNC: 33.5 PG — SIGNIFICANT CHANGE UP (ref 27–34)
MCV RBC AUTO: 107.3 FL — HIGH (ref 80–100)
PLATELET # BLD AUTO: 507 K/UL — HIGH (ref 150–400)
POTASSIUM SERPL-MCNC: 4.2 MMOL/L — SIGNIFICANT CHANGE UP (ref 3.5–5.3)
POTASSIUM SERPL-SCNC: 4.2 MMOL/L — SIGNIFICANT CHANGE UP (ref 3.5–5.3)
RBC # BLD: 1.86 M/UL — LOW (ref 3.8–5.2)
RBC # FLD: 15.6 % — HIGH (ref 10.3–14.5)
SODIUM SERPL-SCNC: 140 MMOL/L — SIGNIFICANT CHANGE UP (ref 135–145)
VANCOMYCIN TROUGH SERPL-MCNC: 20.6 UG/ML — HIGH (ref 10–20)
WBC # BLD: 7.1 K/UL — SIGNIFICANT CHANGE UP (ref 3.8–10.5)
WBC # FLD AUTO: 7.1 K/UL — SIGNIFICANT CHANGE UP (ref 3.8–10.5)

## 2017-06-22 PROCEDURE — 99233 SBSQ HOSP IP/OBS HIGH 50: CPT | Mod: GC

## 2017-06-22 RX ORDER — METOPROLOL TARTRATE 50 MG
50 TABLET ORAL DAILY
Qty: 0 | Refills: 0 | Status: DISCONTINUED | OUTPATIENT
Start: 2017-06-22 | End: 2017-06-26

## 2017-06-22 RX ORDER — CEFEPIME 1 G/1
INJECTION, POWDER, FOR SOLUTION INTRAMUSCULAR; INTRAVENOUS
Qty: 0 | Refills: 0 | Status: DISCONTINUED | OUTPATIENT
Start: 2017-06-22 | End: 2017-06-26

## 2017-06-22 RX ORDER — CEFEPIME 1 G/1
2000 INJECTION, POWDER, FOR SOLUTION INTRAMUSCULAR; INTRAVENOUS EVERY 12 HOURS
Qty: 0 | Refills: 0 | Status: DISCONTINUED | OUTPATIENT
Start: 2017-06-22 | End: 2017-06-26

## 2017-06-22 RX ORDER — CEFEPIME 1 G/1
2000 INJECTION, POWDER, FOR SOLUTION INTRAMUSCULAR; INTRAVENOUS ONCE
Qty: 0 | Refills: 0 | Status: COMPLETED | OUTPATIENT
Start: 2017-06-22 | End: 2017-06-22

## 2017-06-22 RX ADMIN — POLYETHYLENE GLYCOL 3350 17 GRAM(S): 17 POWDER, FOR SOLUTION ORAL at 21:15

## 2017-06-22 RX ADMIN — CEFEPIME 100 MILLIGRAM(S): 1 INJECTION, POWDER, FOR SOLUTION INTRAMUSCULAR; INTRAVENOUS at 11:29

## 2017-06-22 RX ADMIN — SODIUM CHLORIDE 50 MILLILITER(S): 9 INJECTION INTRAMUSCULAR; INTRAVENOUS; SUBCUTANEOUS at 21:26

## 2017-06-22 RX ADMIN — Medication 0.5 MILLIGRAM(S): at 00:32

## 2017-06-22 RX ADMIN — Medication 250 MILLIGRAM(S): at 02:47

## 2017-06-22 RX ADMIN — CEFEPIME 100 MILLIGRAM(S): 1 INJECTION, POWDER, FOR SOLUTION INTRAMUSCULAR; INTRAVENOUS at 18:02

## 2017-06-22 RX ADMIN — PIPERACILLIN AND TAZOBACTAM 25 GRAM(S): 4; .5 INJECTION, POWDER, LYOPHILIZED, FOR SOLUTION INTRAVENOUS at 05:06

## 2017-06-22 RX ADMIN — MORPHINE SULFATE 2 MILLIGRAM(S): 50 CAPSULE, EXTENDED RELEASE ORAL at 11:50

## 2017-06-22 RX ADMIN — MORPHINE SULFATE 2 MILLIGRAM(S): 50 CAPSULE, EXTENDED RELEASE ORAL at 11:37

## 2017-06-22 RX ADMIN — Medication 75 MILLIGRAM(S): at 21:16

## 2017-06-22 RX ADMIN — ENOXAPARIN SODIUM 40 MILLIGRAM(S): 100 INJECTION SUBCUTANEOUS at 14:09

## 2017-06-22 NOTE — PROGRESS NOTE ADULT - SUBJECTIVE AND OBJECTIVE BOX
HPI:  85 y/o f pmhx depression, DM, HLD, HTN, MS, ulcer low back/pelvis presented to the ED with severe back pain. Pt has also been in several nursing facilities until recently, pt was on hospice until 1 month ago. Pt. CT abdomen/pelvis: Bladder appearance consistent with cystitis in the appropriate clinical setting. Correlate with symptomatology and urinalysis. Deep sacral ulcer possibly extending to bone. Osteomyelitis not excluded. Pt has stage IV ulcer (bone visible) - clinically osteomyelitis. Continue Zosyn (Day 3) and vanco (day 2) for ? sacral osteo. Pt states pain is better and she has no overnight events. Urine culture showed contamination, cystitis r/o.    REVIEW OF SYSTEMS:    CONSTITUTIONAL: + weakness, denies fevers or chills  EYES/ENT: No visual changes, no throat pain   RESPIRATORY: No cough, wheezing, hemoptysis; No shortness of breath  CARDIOVASCULAR: No chest pain or palpitations  GASTROINTESTINAL: No abdominal, nausea, vomiting, or hematemesis; No diarrhea or constipation. No melena or hematochezia.  GENITOURINARY: No dysuria, frequency or hematuria  NEUROLOGICAL: No dizziness, numbness, or weakness  SKIN: + mild pain on back where sore is, No itching, burning,  All other review of systems is negative unless indicated above.    Vital Signs Last 24 Hrs  T(C): 36.4, Max: 36.8 (06-21 @ 14:30)  T(F): 97.5, Max: 98.3 (06-21 @ 14:30)  HR: 94 (78 - 109)  BP: 100/62 (100/62 - 124/58)  BP(mean): --  RR: 16 (15 - 17)  SpO2: 100% (96% - 100%)    PHYSICAL EXAM:     GENERAL: frail, cachectic female, no acute distress  HEENT: NC/AT, EOMI, neck supple, MMM  RESPIRATORY: decreased breath sounds b/l, no rhonchi, rales, or wheezing  CARDIOVASCULAR: RRR, no murmurs, gallops, rubs  ABDOMINAL: soft, non-tender, non-distended, positive bowel sounds   EXTREMITIES: + 1 pitting edema in b/l legs, no clubbing, cyanosis  NEUROLOGICAL: alert and oriented x 3, non-focal  SKIN: stage 4 pressure ulcer on sacrum, bone visible  with packing in place   MUSCULOSKELETAL: no gross joint deformity   - Chronic canseco    LABS:                      labs********************************************************    Color: Yellow / Appearance: Turbid / S.010 / pH: x  Gluc: x / Ketone: Negative  / Bili: Small / Urobili: Negative   Blood: x / Protein: 75 mg/dL / Nitrite: Negative   Leuk Esterase: Moderate / RBC: 3-5 /HPF / WBC 11-25   Sq Epi: x / Non Sq Epi: Occasional / Bacteria: TNTC    Urinalysis Basic - ( 2017 19:09 )    - @ 22:18   Culture grew 3 or more types of organisms which indicate  collection contamination; consider recollection only if clinically  indicated.  --  --   @ 21:46   No growth to date.  --  --   @ 21:43   No growth to date.  --  --        CAPILLARY BLOOD GLUCOSE  130 (2017 11:16)  161 (2017 08:45)      MEDICATIONS  (STANDING):  metoprolol succinate ER 50milliGRAM(s) Oral daily  venlafaxine 75milliGRAM(s) Oral at bedtime  polyethylene glycol 3350 17Gram(s) Oral every 24 hours  enoxaparin Injectable 40milliGRAM(s) SubCutaneous daily  sodium chloride 0.9%. 1000milliLiter(s) IV Continuous <Continuous>  vancomycin  IVPB 1000milliGRAM(s) IV Intermittent every 12 hours  piperacillin/tazobactam IVPB. 3.375Gram(s) IV Intermittent every 8 hours      Radiology:    CT head: No acute intracranial pathology.  CXR: no active disease  CT abdomen/pelvis: Bladder appearance consistent with cystitis in the appropriate clinical setting. Correlate with symptomatology and urinalysis. Deep sacral ulcer possibly extending to bone. Osteomyelitis not excluded. Correlate with bone scan or MR. HPI:  87 y/o f pmhx depression, DM, HLD, HTN, MS, ulcer low back/pelvis presented to the ED with severe back pain. Pt has also been in several nursing facilities until recently, pt was on hospice until 1 month ago. Pt. CT abdomen/pelvis: Bladder appearance consistent with cystitis in the appropriate clinical setting. Correlate with symptomatology and urinalysis. Deep sacral ulcer possibly extending to bone. Osteomyelitis not excluded. Pt has stage IV ulcer (bone visible) - clinically osteomyelitis. Continue Zosyn (Day 3) and vanco (day 2) for ? sacral osteo. Pt states pain is better and she has no overnight events. Urine culture showed contamination, cystitis r/o.    REVIEW OF SYSTEMS:    CONSTITUTIONAL: + weakness, denies fevers or chills  EYES/ENT: No visual changes, no throat pain   RESPIRATORY: No cough, wheezing, hemoptysis; No shortness of breath  CARDIOVASCULAR: No chest pain or palpitations  GASTROINTESTINAL: No abdominal, nausea, vomiting, or hematemesis; No diarrhea or constipation. No melena or hematochezia.  GENITOURINARY: No dysuria, frequency or hematuria  NEUROLOGICAL: No dizziness, numbness, or weakness  SKIN: + mild pain on back where sore is, No itching, burning,  All other review of systems is negative unless indicated above.    Vital Signs Last 24 Hrs  T(C): 36.4, Max: 36.8 (06-21 @ 14:30)  T(F): 97.5, Max: 98.3 (06-21 @ 14:30)  HR: 94 (78 - 109)  BP: 100/62 (100/62 - 124/58)  BP(mean): --  RR: 16 (15 - 17)  SpO2: 100% (96% - 100%)    PHYSICAL EXAM:     GENERAL: frail, cachectic female, no acute distress  HEENT: NC/AT, EOMI, neck supple, MMM  RESPIRATORY: decreased breath sounds b/l, no rhonchi, rales, or wheezing  CARDIOVASCULAR: RRR, no murmurs, gallops, rubs  ABDOMINAL: soft, non-tender, non-distended, positive bowel sounds   EXTREMITIES: + 1 pitting edema in b/l legs, no clubbing, cyanosis  NEUROLOGICAL: alert and oriented x 3, non-focal  SKIN: stage 4 pressure ulcer on sacrum, bone visible  with packing in place   MUSCULOSKELETAL: no gross joint deformity   - Chronic canseco    LABS:                                     6.2    7.1   )-----------( 507      ( 2017 08:37 )             19.9         140  |  108  |  42<H>  ----------------------------<  121<H>  4.2   |  25  |  0.96    Ca    7.9<L>      2017 08:37    TPro  6.5  /  Alb  2.8<L>  /  TBili  0.1<L>  /  DBili  x   /  AST  14<L>  /  ALT  12  /  AlkPhos  51  20    PT/INR - ( 2017 17:35 )   PT: 11.8 sec;   INR: 1.08 ratio         PTT - ( 2017 17:35 )  PTT:33.3 sec  Urinalysis Basic - ( 2017 19:09 )    Color: Yellow / Appearance: Turbid / S.010 / pH: x  Gluc: x / Ketone: Negative  / Bili: Small / Urobili: Negative   Blood: x / Protein: 75 mg/dL / Nitrite: Negative   Leuk Esterase: Moderate / RBC: 3-5 /HPF / WBC 11-25   Sq Epi: x / Non Sq Epi: Occasional / Bacteria: [facility]     @ 22:18   Culture grew 3 or more types of organisms which indicate  collection contamination; consider recollection only if clinically  indicated.  --  --   @ 21:46   No growth to date.  --  --   @ 21:43   No growth to date.  --  --        CAPILLARY BLOOD GLUCOSE  130 (2017 11:16)  161 (2017 08:45)      MEDICATIONS  (STANDING):  metoprolol succinate ER 50milliGRAM(s) Oral daily  venlafaxine 75milliGRAM(s) Oral at bedtime  polyethylene glycol 3350 17Gram(s) Oral every 24 hours  enoxaparin Injectable 40milliGRAM(s) SubCutaneous daily  sodium chloride 0.9%. 1000milliLiter(s) IV Continuous <Continuous>  vancomycin  IVPB 1000milliGRAM(s) IV Intermittent every 12 hours  piperacillin/tazobactam IVPB. 3.375Gram(s) IV Intermittent every 8 hours      Radiology:    CT head: No acute intracranial pathology.  CXR: no active disease  CT abdomen/pelvis: Bladder appearance consistent with cystitis in the appropriate clinical setting. Correlate with symptomatology and urinalysis. Deep sacral ulcer possibly extending to bone. Osteomyelitis not excluded. Correlate with bone scan or MR. HPI:  85 y/o f pmhx depression, DM, HLD, HTN, MS, ulcer low back/pelvis presented to the ED with severe back pain. Pt has also been in several nursing facilities until recently, pt was on hospice until 1 month ago. Pt. CT abdomen/pelvis: Bladder appearance consistent with cystitis in the appropriate clinical setting. Correlate with symptomatology and urinalysis. Deep sacral ulcer possibly extending to bone. Osteomyelitis not excluded. Pt has stage IV ulcer (bone visible) - clinically osteomyelitis. Continue Zosyn (Day 3) and vanco (day 2) for ? sacral osteo. Pt states pain is better and she has no overnight events. Urine culture showed contamination, cystitis r/o. H/H 6.4/20.2, 1 unit PRBC ordered - will f/u h/h 18:30. Pt to get PICC for long term abx as per ID for osteo - 6 weeks Cefepime 2grams IV q12 so until  with weekly CBC, BMP, ESR drawn starting this coming  and done on  with results faxed to our office [phone]      REVIEW OF SYSTEMS:    CONSTITUTIONAL: + weakness, denies fevers or chills  EYES/ENT: No visual changes, no throat pain   RESPIRATORY: No cough, wheezing, hemoptysis; No shortness of breath  CARDIOVASCULAR: No chest pain or palpitations  GASTROINTESTINAL: No abdominal, nausea, vomiting, or hematemesis; No diarrhea or constipation. No melena or hematochezia.  GENITOURINARY: No dysuria, frequency or hematuria  NEUROLOGICAL: No dizziness, numbness, or weakness  SKIN: + mild pain on back where sore is, No itching, burning,  All other review of systems is negative unless indicated above.    Vital Signs Last 24 Hrs  T(C): 36.4, Max: 36.8 (- @ 14:30)  T(F): 97.5, Max: 98.3 (- @ 14:30)  HR: 94 (78 - 109)  BP: 100/62 (100/62 - 124/58)  BP(mean): --  RR: 16 (15 - 17)  SpO2: 100% (96% - 100%)    PHYSICAL EXAM:     GENERAL: pale, frail, cachectic female, no acute distress  HEENT: + conjunctival pallor, NC/AT, EOMI, neck supple, MMM  RESPIRATORY: decreased breath sounds b/l, no rhonchi, rales, or wheezing  CARDIOVASCULAR: RRR, no murmurs, gallops, rubs  ABDOMINAL: soft, non-tender, non-distended, positive bowel sounds   EXTREMITIES: + 1 pitting edema in b/l legs, no clubbing, cyanosis  NEUROLOGICAL: alert and oriented x 3, non-focal  SKIN: stage 4 pressure ulcer on sacrum, bone visible  with packing in place   MUSCULOSKELETAL: no gross joint deformity   - Chronic canseco    LABS:                                     6.2    7.1   )-----------( 507      ( 2017 08:37 )             19.9         140  |  108  |  42<H>  ----------------------------<  121<H>  4.2   |  25  |  0.96    Ca    7.9<L>      2017 08:37    TPro  6.5  /  Alb  2.8<L>  /  TBili  0.1<L>  /  DBili  x   /  AST  14<L>  /  ALT  12  /  AlkPhos  51  -20    PT/INR - ( 2017 17:35 )   PT: 11.8 sec;   INR: 1.08 ratio         PTT - ( 2017 17:35 )  PTT:33.3 sec  Urinalysis Basic - ( 2017 19:09 )    Color: Yellow / Appearance: Turbid / S.010 / pH: x  Gluc: x / Ketone: Negative  / Bili: Small / Urobili: Negative   Blood: x / Protein: 75 mg/dL / Nitrite: Negative   Leuk Esterase: Moderate / RBC: 3-5 /HPF / WBC 11-25   Sq Epi: x / Non Sq Epi: Occasional / Bacteria: TNTC     @ 22:18   Culture grew 3 or more types of organisms which indicate  collection contamination; consider recollection only if clinically  indicated.  --  --   @ 21:46   No growth to date.  --  --   @ 21:43   No growth to date.  --  --        CAPILLARY BLOOD GLUCOSE  130 (2017 11:16)  161 (2017 08:45)      MEDICATIONS  (STANDING):  metoprolol succinate ER 50milliGRAM(s) Oral daily  venlafaxine 75milliGRAM(s) Oral at bedtime  polyethylene glycol 3350 17Gram(s) Oral every 24 hours  enoxaparin Injectable 40milliGRAM(s) SubCutaneous daily  sodium chloride 0.9%. 1000milliLiter(s) IV Continuous <Continuous>  vancomycin  IVPB 1000milliGRAM(s) IV Intermittent every 12 hours  piperacillin/tazobactam IVPB. 3.375Gram(s) IV Intermittent every 8 hours      Radiology:    CT head: No acute intracranial pathology.  CXR: no active disease  CT abdomen/pelvis: Bladder appearance consistent with cystitis in the appropriate clinical setting. Correlate with symptomatology and urinalysis. Deep sacral ulcer possibly extending to bone. Osteomyelitis not excluded. Correlate with bone scan or MR. HPI:  85 y/o f pmhx depression, DM, HLD, HTN, MS, ulcer low back/pelvis presented to the ED with severe back pain. Pt has also been in several nursing facilities until recently, pt was on hospice until 1 month ago. Pt. CT abdomen/pelvis: Bladder appearance consistent with cystitis in the appropriate clinical setting. Correlate with symptomatology and urinalysis. Deep sacral ulcer possibly extending to bone. Osteomyelitis not excluded. Pt has stage IV ulcer (bone visible) - clinically osteomyelitis. Continue Zosyn (Day 3) and vanco (day 2) for ? sacral osteo. Pt states pain is better and she has no overnight events.  REVIEW OF SYSTEMS:    CONSTITUTIONAL: + weakness, denies fevers or chills  EYES/ENT: No visual changes, no throat pain   RESPIRATORY: No cough, wheezing, hemoptysis; No shortness of breath  CARDIOVASCULAR: No chest pain or palpitations  GASTROINTESTINAL: No abdominal, nausea, vomiting, or hematemesis; No diarrhea or constipation. No melena or hematochezia.  GENITOURINARY: No dysuria, frequency or hematuria  NEUROLOGICAL: No dizziness, numbness, or weakness  SKIN: + mild pain on back where sore is, No itching, burning,  All other review of systems is negative unless indicated above.    Vital Signs Last 24 Hrs  T(C): 36.4, Max: 36.8 (06-21 @ 14:30)  T(F): 97.5, Max: 98.3 (06-21 @ 14:30)  HR: 94 (78 - 109)  BP: 100/62 (100/62 - 124/58)  BP(mean): --  RR: 16 (15 - 17)  SpO2: 100% (96% - 100%)    PHYSICAL EXAM:     GENERAL: pale, frail, cachectic female, no acute distress  HEENT: + conjunctival pallor, NC/AT, EOMI, neck supple, MMM  RESPIRATORY: decreased breath sounds b/l, no rhonchi, rales, or wheezing  CARDIOVASCULAR: RRR, no murmurs, gallops, rubs  ABDOMINAL: soft, non-tender, non-distended, positive bowel sounds   EXTREMITIES: + 1 pitting edema in b/l legs, no clubbing, cyanosis  NEUROLOGICAL: alert and oriented x 3, non-focal  SKIN: stage 4 pressure ulcer on sacrum, bone visible  with packing in place   MUSCULOSKELETAL: no gross joint deformity   - Chronic canseco    LABS:                                     6.2    7.1   )-----------( 507      ( 2017 08:37 )             19.9         140  |  108  |  42<H>  ----------------------------<  121<H>  4.2   |  25  |  0.96    Ca    7.9<L>      2017 08:37    TPro  6.5  /  Alb  2.8<L>  /  TBili  0.1<L>  /  DBili  x   /  AST  14<L>  /  ALT  12  /  AlkPhos  51      PT/INR - ( 2017 17:35 )   PT: 11.8 sec;   INR: 1.08 ratio         PTT - ( 2017 17:35 )  PTT:33.3 sec  Urinalysis Basic - ( 2017 19:09 )    Color: Yellow / Appearance: Turbid / S.010 / pH: x  Gluc: x / Ketone: Negative  / Bili: Small / Urobili: Negative   Blood: x / Protein: 75 mg/dL / Nitrite: Negative   Leuk Esterase: Moderate / RBC: 3-5 /HPF / WBC 11-25   Sq Epi: x / Non Sq Epi: Occasional / Bacteria: TNTC     @ 22:18   Culture grew 3 or more types of organisms which indicate  collection contamination; consider recollection only if clinically  indicated.  --  --   @ 21:46   No growth to date.  --  --   @ 21:43   No growth to date.  --  --        CAPILLARY BLOOD GLUCOSE  130 (2017 11:16)  161 (2017 08:45)      MEDICATIONS  (STANDING):  metoprolol succinate ER 50milliGRAM(s) Oral daily  venlafaxine 75milliGRAM(s) Oral at bedtime  polyethylene glycol 3350 17Gram(s) Oral every 24 hours  enoxaparin Injectable 40milliGRAM(s) SubCutaneous daily  sodium chloride 0.9%. 1000milliLiter(s) IV Continuous <Continuous>  vancomycin  IVPB 1000milliGRAM(s) IV Intermittent every 12 hours  piperacillin/tazobactam IVPB. 3.375Gram(s) IV Intermittent every 8 hours      Radiology:    CT head: No acute intracranial pathology.  CXR: no active disease  CT abdomen/pelvis: Bladder appearance consistent with cystitis in the appropriate clinical setting. Correlate with symptomatology and urinalysis. Deep sacral ulcer possibly extending to bone. Osteomyelitis not excluded. Correlate with bone scan or MR.

## 2017-06-22 NOTE — PROGRESS NOTE ADULT - PROBLEM SELECTOR PLAN 1
Unclear at this point if infection has extended down into the bone but with initial erythema change in mental status with no other obvious localization and CT findings recommend treatment to prevent development of chronic osteomyelitis. With negative blood cultures can place PICC at this point    -6 weeks Cefepime 2grams IV q12 so until August 2nd with weekly CBC, BMP, ESR drawn starting this coming Monday June 26th and done on Mondays with results faxed to our office [phone]    Patient to followup in our office in 2 weeks please verify scheduled prior to discharge    office # [phone]    Thank you for consulting us and involving us in the management of this most interesting and challenging case. We will follow post discharge.    Please Call with any further questions

## 2017-06-22 NOTE — PROGRESS NOTE ADULT - SUBJECTIVE AND OBJECTIVE BOX
infectious diseases progress note:    CARMINE PAYTON is a 86y y. o. Female patient    Patient reports: feeling better with no new issues    ROS:    EYES:  Negative  blurry vision or double vision  GASTROINTESTINAL:  Negative for nausea, vomiting, diarrhea  -otherwise negative except for subjective    Allergies    No Known Allergies    Intolerances        ANTIBIOTICS/RELEVANT:  antimicrobials  cefepime  IVPB  IV Intermittent     immunologic:    OTHER:  venlafaxine 75milliGRAM(s) Oral at bedtime  polyethylene glycol 3350 17Gram(s) Oral every 24 hours  senna 2Tablet(s) Oral at bedtime PRN  acetaminophen   Tablet. 650milliGRAM(s) Oral every 6 hours PRN  enoxaparin Injectable 40milliGRAM(s) SubCutaneous daily  sodium chloride 0.9%. 1000milliLiter(s) IV Continuous <Continuous>  morphine  - Injectable 2milliGRAM(s) IV Push every 4 hours PRN  oxyCODONE  5 mG/acetaminophen 325 mG 1Tablet(s) Oral every 4 hours PRN  metoprolol succinate ER 50milliGRAM(s) Oral daily      Objective:  Vital Signs Last 24 Hrs  T(C): 36.4, Max: 36.8 ( @ 14:30)  T(F): 97.5, Max: 98.3 ( @ 14:30)  HR: 94 (88 - 109)  BP: 100/62 (100/62 - 124/58)  BP(mean): --  RR: 16 (16 - 17)  SpO2: 100% (97% - 100%)    T(C): 36.4, Max: 36.8 ( @ 14:30)  T(C): 36.4, Max: 36.8 ( @ 14:30)  T(C): 36.4, Max: 36.8 ( @ 14:30)    PHYSICAL EXAM:  Constitutional:Well-developed, well nourished  Eyes:PERRLA, EOMI  Ear/Nose/Throat: oropharynx normal	  Neck:no JVD, no lymphadenopathy, supple  Respiratory: no accessory muscle use  Cardiovascular:RRR,   Gastrointestinal:soft, NT  Extremities:no clubbing, no cyanosis, edema absent  Skin: wound vac in place with diminished erythema in sacral area      LABS:                        6.4    x     )-----------( x        ( 2017 09:30 )             20.2       7.1  @ 08:37  10.8  @ 08:03  9.0  @ 17:35          140  |  108  |  42<H>  ----------------------------<  121<H>  4.2   |  25  |  0.96    Ca    7.9<L>      2017 08:37    TPro  6.5  /  Alb  2.8<L>  /  TBili  0.1<L>  /  DBili  x   /  AST  14<L>  /  ALT  12  /  AlkPhos  51      PT/INR - ( 2017 17:35 )   PT: 11.8 sec;   INR: 1.08 ratio         PTT - ( 2017 17:35 )  PTT:33.3 sec  Urinalysis Basic - ( 2017 19:09 )    Color: Yellow / Appearance: Turbid / S.010 / pH: x  Gluc: x / Ketone: Negative  / Bili: Small / Urobili: Negative   Blood: x / Protein: 75 mg/dL / Nitrite: Negative   Leuk Esterase: Moderate / RBC: 3-5 /HPF / WBC 11-25   Sq Epi: x / Non Sq Epi: Occasional / Bacteria: TNTC          MICROBIOLOGY:    Culture - Blood (17 @ 21:46)    Specimen Source: .Blood Blood-Venous    Culture Results:   No growth to date.    Culture - Blood (17 @ 21:43)    Specimen Source: .Blood Blood-Venous    Culture Results:   No growth to date.          RADIOLOGY & ADDITIONAL STUDIES:      EXAM:  CT ABDOMEN AND PELVIS IC                            PROCEDURE DATE:  2017        INTERPRETATION:  History: Sacral wound possible infection.      Impression:  Bladder appearance consistent with cystitis in the appropriate clinical   setting. Correlate with symptomatology and urinalysis.  Deep sacral ulcer possibly extending to bone. Osteomyelitis not excluded.   Correlate with bone scan or MR.  additional findings as discussed.

## 2017-06-22 NOTE — PROGRESS NOTE ADULT - PROBLEM SELECTOR PLAN 1
- CT abd/pelvis - Deep sacral ulcer possibly extending to bone. Osteomyelitis not excluded.  - Clinically it is osteo as bone is visible - stage 4   -F/u wound culture  - wbc increased to 10.8   -ID consulted - f/u reccs  -Wound care consulted - recc silver alginate rope packing changed every other day, VAC, specialty bed.  -Continue Vanco day 2 and Zosyn day 3  -Pain control with tylenol for mild, morphine 2mg q5 for moderate, dilaudid 1mg q 4 for severe,   -Rotate in bed frequently  - blood cultures negative - CT abd/pelvis - Deep sacral ulcer possibly extending to bone. Osteomyelitis not excluded.  - Clinically it is osteo as bone is visible - stage 4   -F/u wound culture  - wbc decreased to 7.1  -ID consulted - f/u reccs  -Wound care consulted - recc silver alginate rope packing changed every other day, VAC, specialty bed.  -Continue Vanco day 2 and Zosyn day 3  -Pain control with tylenol for mild, morphine 2mg q5 for moderate, dilaudid 1mg q 4 for severe,   -Rotate in bed frequently  - blood cultures negative - CT abd/pelvis - Deep sacral ulcer possibly extending to bone. Osteomyelitis not excluded.  - Clinically it is osteo as bone is visible - stage 4   -F/u wound culture  - wbc decreased to 7.1  -ID consulted, Dr. Voss, - recommend antibiotics now pending further clarification of clinical picture. Will be a challenging distinction between osteomyelitis and cellulitis.    -Wound care consulted - recc silver alginate rope packing changed every other day, VAC, specialty bed.  -Continue Vanco day 2 and Zosyn day 3  -Pain control with tylenol for mild, morphine 2mg q5 for moderate, dilaudid 1mg q 4 for severe,   -Rotate in bed frequently  - blood cultures negative - CT abd/pelvis - Deep sacral ulcer possibly extending to bone. Osteomyelitis not excluded.  - Clinically it is osteo as bone is visible - stage 4   -F/u wound culture  - wbc decreased to 7.1  -ID consulted, Dr. Voss, - Pt to get PICC for long term abx as per ID for osteo - 6 weeks Cefepime 2grams IV q12 so until August 2nd with weekly CBC, BMP, ESR drawn starting this coming Monday June 26th and done on Mondays with results faxed to our office [phone].    -Wound care consulted - recc silver alginate rope packing changed every other day, VAC, specialty bed.  -Continue Vanco day 2 and Zosyn day 3  -Pain control with tylenol for mild, morphine 2mg q5 for moderate, dilaudid 1mg q 4 for severe,   -Rotate in bed frequently  - blood cultures negative - CT abd/pelvis - Deep sacral ulcer possibly extending to bone. Osteomyelitis not excluded.  - Clinically it is osteo as bone is visible - stage 4   -F/u wound culture  - wbc decreased to 7.1  -ID consulted, Dr. Voss, - Pt to get PICC for long term abx as per ID for osteo - 6 weeks Cefepime 2grams IV q12 so until August 2nd  -Wound care consulted - recc silver alginate rope packing changed every other day, VAC, specialty bed.  -Pain control with tylenol for mild, morphine 2mg q5 for moderate, dilaudid 1mg q 4 for severe,   -Rotate in bed frequently  - blood cultures negative

## 2017-06-22 NOTE — PROGRESS NOTE ADULT - PROBLEM SELECTOR PLAN 6
d/c metformin due to possible risk of DERECK, will monitor sugars with accu checks and decide if ISS is necessary

## 2017-06-22 NOTE — PROGRESS NOTE ADULT - ASSESSMENT
85 y/o f pmhx depression, DM, HLD, HTN, MS, ulcer low back/pelvis presented to the ED with severe back pain admitted with sacral osteomyelitis.

## 2017-06-22 NOTE — PROGRESS NOTE ADULT - PROBLEM SELECTOR PLAN 5
- h/h 7.2/ 23.0 - stable as baseline as of july 2016 it was similar at 7.3/23.1  will monitor - h/h now 6.2/19.9 from 7.2/ 23.0 yesterday - stat h/h ordered  -  baseline as of july 2016 it was similar at 7.3/23.1  will monitor - h/h now 6.2/19.9 from 7.2/ 23.0 yesterday - stat h/h ordered- 6.4/20.2  - 1 unit PRBC ordered - will f/u h/h 18:30   -  baseline as of july 2016 it was similar at 7.3/23.1  will monitor

## 2017-06-23 DIAGNOSIS — N17.9 ACUTE KIDNEY FAILURE, UNSPECIFIED: ICD-10-CM

## 2017-06-23 DIAGNOSIS — E46 UNSPECIFIED PROTEIN-CALORIE MALNUTRITION: ICD-10-CM

## 2017-06-23 LAB
ANION GAP SERPL CALC-SCNC: 8 MMOL/L — SIGNIFICANT CHANGE UP (ref 5–17)
APPEARANCE UR: CLEAR — SIGNIFICANT CHANGE UP
BACTERIA # UR AUTO: ABNORMAL
BILIRUB UR-MCNC: NEGATIVE — SIGNIFICANT CHANGE UP
BUN SERPL-MCNC: 47 MG/DL — HIGH (ref 7–23)
CALCIUM SERPL-MCNC: 7.9 MG/DL — LOW (ref 8.5–10.1)
CHLORIDE SERPL-SCNC: 109 MMOL/L — HIGH (ref 96–108)
CO2 SERPL-SCNC: 23 MMOL/L — SIGNIFICANT CHANGE UP (ref 22–31)
COLOR SPEC: YELLOW — SIGNIFICANT CHANGE UP
CREAT SERPL-MCNC: 1.4 MG/DL — HIGH (ref 0.5–1.3)
DIFF PNL FLD: NEGATIVE — SIGNIFICANT CHANGE UP
EPI CELLS # UR: SIGNIFICANT CHANGE UP
GLUCOSE SERPL-MCNC: 85 MG/DL — SIGNIFICANT CHANGE UP (ref 70–99)
GLUCOSE UR QL: NEGATIVE — SIGNIFICANT CHANGE UP
HCT VFR BLD CALC: 26.4 % — LOW (ref 34.5–45)
HGB BLD-MCNC: 8.4 G/DL — LOW (ref 11.5–15.5)
KETONES UR-MCNC: NEGATIVE — SIGNIFICANT CHANGE UP
LEUKOCYTE ESTERASE UR-ACNC: ABNORMAL
MCHC RBC-ENTMCNC: 31.9 GM/DL — LOW (ref 32–36)
MCHC RBC-ENTMCNC: 32.6 PG — SIGNIFICANT CHANGE UP (ref 27–34)
MCV RBC AUTO: 102.2 FL — HIGH (ref 80–100)
NITRITE UR-MCNC: NEGATIVE — SIGNIFICANT CHANGE UP
PH UR: 5 — SIGNIFICANT CHANGE UP (ref 5–8)
PLATELET # BLD AUTO: 510 K/UL — HIGH (ref 150–400)
POTASSIUM SERPL-MCNC: 4.2 MMOL/L — SIGNIFICANT CHANGE UP (ref 3.5–5.3)
POTASSIUM SERPL-SCNC: 4.2 MMOL/L — SIGNIFICANT CHANGE UP (ref 3.5–5.3)
PROT UR-MCNC: 75 MG/DL
RBC # BLD: 2.58 M/UL — LOW (ref 3.8–5.2)
RBC # FLD: 16.7 % — HIGH (ref 10.3–14.5)
RBC CASTS # UR COMP ASSIST: SIGNIFICANT CHANGE UP /HPF (ref 0–4)
SODIUM SERPL-SCNC: 140 MMOL/L — SIGNIFICANT CHANGE UP (ref 135–145)
SP GR SPEC: 1.01 — SIGNIFICANT CHANGE UP (ref 1.01–1.02)
UROBILINOGEN FLD QL: NEGATIVE — SIGNIFICANT CHANGE UP
WBC # BLD: 8.2 K/UL — SIGNIFICANT CHANGE UP (ref 3.8–10.5)
WBC # FLD AUTO: 8.2 K/UL — SIGNIFICANT CHANGE UP (ref 3.8–10.5)
WBC UR QL: ABNORMAL

## 2017-06-23 PROCEDURE — 77001 FLUOROGUIDE FOR VEIN DEVICE: CPT | Mod: 26

## 2017-06-23 PROCEDURE — 36569 INSJ PICC 5 YR+ W/O IMAGING: CPT

## 2017-06-23 PROCEDURE — 76937 US GUIDE VASCULAR ACCESS: CPT | Mod: 26

## 2017-06-23 PROCEDURE — 99233 SBSQ HOSP IP/OBS HIGH 50: CPT | Mod: GC

## 2017-06-23 RX ORDER — DOCUSATE SODIUM 100 MG
100 CAPSULE ORAL
Qty: 0 | Refills: 0 | Status: DISCONTINUED | OUTPATIENT
Start: 2017-06-23 | End: 2017-06-26

## 2017-06-23 RX ORDER — SODIUM CHLORIDE 9 MG/ML
1000 INJECTION INTRAMUSCULAR; INTRAVENOUS; SUBCUTANEOUS
Qty: 0 | Refills: 0 | Status: DISCONTINUED | OUTPATIENT
Start: 2017-06-23 | End: 2017-06-26

## 2017-06-23 RX ADMIN — Medication 50 MILLIGRAM(S): at 05:24

## 2017-06-23 RX ADMIN — POLYETHYLENE GLYCOL 3350 17 GRAM(S): 17 POWDER, FOR SOLUTION ORAL at 20:10

## 2017-06-23 RX ADMIN — ENOXAPARIN SODIUM 40 MILLIGRAM(S): 100 INJECTION SUBCUTANEOUS at 12:02

## 2017-06-23 RX ADMIN — CEFEPIME 100 MILLIGRAM(S): 1 INJECTION, POWDER, FOR SOLUTION INTRAMUSCULAR; INTRAVENOUS at 05:24

## 2017-06-23 RX ADMIN — MORPHINE SULFATE 2 MILLIGRAM(S): 50 CAPSULE, EXTENDED RELEASE ORAL at 00:21

## 2017-06-23 RX ADMIN — MORPHINE SULFATE 2 MILLIGRAM(S): 50 CAPSULE, EXTENDED RELEASE ORAL at 07:04

## 2017-06-23 RX ADMIN — MORPHINE SULFATE 2 MILLIGRAM(S): 50 CAPSULE, EXTENDED RELEASE ORAL at 01:21

## 2017-06-23 RX ADMIN — CEFEPIME 100 MILLIGRAM(S): 1 INJECTION, POWDER, FOR SOLUTION INTRAMUSCULAR; INTRAVENOUS at 18:08

## 2017-06-23 RX ADMIN — Medication 75 MILLIGRAM(S): at 21:18

## 2017-06-23 RX ADMIN — MORPHINE SULFATE 2 MILLIGRAM(S): 50 CAPSULE, EXTENDED RELEASE ORAL at 06:34

## 2017-06-23 RX ADMIN — Medication 100 MILLIGRAM(S): at 18:08

## 2017-06-23 NOTE — DISCHARGE NOTE ADULT - HOSPITAL COURSE
87 y/o f pmhx depression, DM, HLD, HTN, MS, ulcer low back/pelvis presented to the ED with severe back pain. Pt was lethargic and not able to speak,  at bedside who stated pt awoke at midnight c/o back ache,  gave Xanax to pt and she was able to sleep the remainder of the night. When she woke up in the morning she began to complain of the pain again and was unable to find a comfortable position. He gave her another Xanax but this did not seem to help the pain this time.  states she has a "very bad bed wound" since January 2016. She has been seen by wound care and has had debridement of the wounds. She has also been in several nursing facilities until recently, pt was on hospice until 1 month ago. Pt. did not want to answer any questions at time of exam. History obtained from . 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 In the ED: H/H: 8/25.8, BUN 47, albumin 2.8, procalcitonin .14, Lactate 1, UA: turbid, moderate LE, 11-25 WBC, pH 9, 3-5 RBC, TNTC bacteria. CT head: No acute intracranial pathology. CXR: no active disease. CT abdomen/pelvis: Bladder appearance consistent with cystitis in the appropriate clinical setting. Correlate with symptomatology and urinalysis. Deep sacral ulcer possibly extending to bone. Osteomyelitis not excluded. Correlate with bone scan or MR. Has stage IV ulcer (bone visible) - clinically osteomyelitis. Pt was initially treated with Zosyn and vanco for sacral osteo which was changed to Cefepime Pt got a PICC for 6 week abx course. 85 y/o f pmhx depression, DM, HLD, HTN, MS, ulcer low back/pelvis presented to the ED with severe back pain. Pt was lethargic and not able to speak,  at bedside who stated pt awoke at midnight c/o back ache,  gave Xanax to pt and she was able to sleep the remainder of the night. When she woke up in the morning she began to complain of the pain again and was unable to find a comfortable position. He gave her another Xanax but this did not seem to help the pain this time.  states she has a "very bad bed wound" since January 2016. She has been seen by wound care and has had debridement of the wounds. She has also been in several nursing facilities until recently, pt was on hospice until 1 month ago. Pt. did not want to answer any questions at time of exam. History obtained from . 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 In the ED: H/H: 8/25.8, BUN 47, albumin 2.8, procalcitonin .14, Lactate 1, UA: turbid, moderate LE, 11-25 WBC, pH 9, 3-5 RBC, TNTC bacteria. CT head: No acute intracranial pathology. CXR: no active disease. CT abdomen/pelvis: Bladder appearance consistent with cystitis in the appropriate clinical setting. Correlate with symptomatology and urinalysis. Deep sacral ulcer possibly extending to bone. Osteomyelitis not excluded. Correlate with bone scan or MR. Has stage IV ulcer (bone visible) - clinically osteomyelitis. Pt was initially treated with Zosyn and vanco for sacral osteo which was changed to Cefepime Pt got a PICC for 6 week abx course.    Sacral wound culture grew rare bacillus. Wound care consulted - [facility] silver alginate rope packing changed every other day, VAC, specialty bed. Pain was control with tylenol for mild, percocet 1 (moderate), percocet 2 (severe). Pt was rotated in bed frequently and blood cultures negative. Pt developed DERECK, Cr 1.4 from .96, likely 2/2 vanco which is d/c as well as the IV contrast. IVF increased to 75ml/hr. Nephrology consulted-[facility]. h/h went up to 8.5/25.7 from 6.4/20.2 s/p 1 unit PRBC. Pt has chronic anemia, baseline as of july 2016 it was similar at 7.3/23.1. 85 y/o f pmhx depression, DM, HLD, HTN, MS, ulcer low back/pelvis presented to the ED with severe back pain. Pt was lethargic and not able to speak,  at bedside who stated pt awoke at midnight c/o back ache,  gave Xanax to pt and she was able to sleep the remainder of the night. When she woke up in the morning she began to complain of the pain again and was unable to find a comfortable position. He gave her another Xanax but this did not seem to help the pain this time.  states she has a "very bad bed wound" since January 2016. She has been seen by wound care and has had debridement of the wounds. She has also been in several nursing facilities until recently, pt was on hospice until 1 month ago. Pt. did not want to answer any questions at time of exam. History obtained from . 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 In the ED: H/H: 8/25.8, BUN 47, albumin 2.8, procalcitonin .14, Lactate 1, UA: turbid, moderate LE, 11-25 WBC, pH 9, 3-5 RBC, TNTC bacteria. CT head: No acute intracranial pathology. CXR: no active disease. CT abdomen/pelvis: Bladder appearance consistent with cystitis in the appropriate clinical setting. Correlate with symptomatology and urinalysis. Deep sacral ulcer possibly extending to bone. Osteomyelitis not excluded. Correlate with bone scan or MR. Has stage IV ulcer (bone visible) - clinically osteomyelitis. Pt was initially treated with Zosyn and vanco for sacral osteo which was changed to Cefepime Pt got a PICC for 6 week abx course. Sacral wound culture grew rare bacillus. Wound care consulted - [facility] silver alginate rope packing changed every other day, VAC, specialty bed. Pain was control with tylenol for mild, percocet 1 (moderate), percocet 2 (severe). Pt was rotated in bed frequently and blood cultures negative. Pt developed DERECK, Cr 1.4 from .96, likely 2/2 vanco which is d/c as well as the IV contrast. IVF increased to 75ml/hr. Nephrology consulted-[facility]. h/h went up to 8.5/25.7 from 6.4/20.2 s/p 1 unit PRBC. Pt has chronic anemia, baseline as of july 2016 it was similar at 7.3/23.1. MS was controlled with pain control and pt has a chronic canseco Cystitis was r/o as culture showed more than 3 organisms, likely contamination.  Hypertension was controlled with Metoprolol with holds. Diabetes mellitus was stable. Metformin was dc due to possible risk of DERECK, will monitor sugars with accu checks and decide if ISS is necessary.  Malnutrition was controlled with PO Glucerna Shake® . Depression was controlled with Effexor, Xanax prn. 85 y/o f pmhx depression, DM, HLD, HTN, MS, ulcer low back/pelvis presented to the ED with severe back pain. Pt was lethargic and not able to speak,  at bedside who stated pt awoke at midnight c/o back ache,  gave Xanax to pt and she was able to sleep the remainder of the night. When she woke up in the morning she began to complain of the pain again and was unable to find a comfortable position. He gave her another Xanax but this did not seem to help the pain this time.  states she has a "very bad bed wound" since January 2016. She has been seen by wound care and has had debridement of the wounds. She has also been in several nursing facilities until recently, pt was on hospice until 1 month ago. Pt. did not want to answer any questions at time of exam. History obtained from . 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 In the ED: H/H: 8/25.8, BUN 47, albumin 2.8, procalcitonin .14, Lactate 1, UA: turbid, moderate LE, 11-25 WBC, pH 9, 3-5 RBC, TNTC bacteria. CT head: No acute intracranial pathology. CXR: no active disease. CT abdomen/pelvis: Bladder appearance consistent with cystitis in the appropriate clinical setting. Correlate with symptomatology and urinalysis. Deep sacral ulcer possibly extending to bone. Osteomyelitis not excluded. Correlate with bone scan or MR.     Pt has stage IV ulcer (bone visible) - clinically osteomyelitis. Pt was initially treated with Zosyn and vanco for sacral osteo which was changed to Cefepime. Pt got a PICC for 6 week abx course. Pt to continue taking cefepime until august 2nd. Sacral wound culture grew rare bacillus. Wound care consulted - [facility] silver alginate rope packing changed every other day, VAC, specialty bed. Pain was control with tylenol for mild, percocet 1 (moderate), percocet 2 (severe). Pt was rotated in bed frequently and blood cultures negative. Pt developed DERECK, Cr 1.4 from .96, likely 2/2 vanco which is d/c as well as the IV contrast. IVF increased to 75ml/hr. Nephrology consulted-[facility]. Through out the hospital course DERECK resolved and Cr became normal.     Pt hac chronic anemia, h/h need one unit PRBC and H/H went up to 8.5/25.7 from 6.4/20.2 s/p 1 unit PRBC. Pt has chronic anemia, baseline as of july 2016 it was similar at 7.3/23.1. MS was controlled with pain control and pt has a chronic canseco. Cystitis was r/o as culture showed more than 3 organisms, likely contamination.  Hypertension was controlled with Metoprolol with holds. Diabetes mellitus was stable. Metformin was dc due to possible risk of DERECK and sugars were controlled without metformin, no insulin needed during hospital course. Malnutrition was controlled with PO Glucerna Shake® . Depression was controlled with Effexor, Xanax prn.     Pt was seen and evaluated on day of discharge.    More than 30 min was spent on this note Darlin bentley pmhx depression, DM, HLD, HTN, MS, ulcer low back/pelvis presented to the ED with severe back pain. Pt was lethargic and not able to speak,  at bedside who stated pt awoke at midnight c/o back ache,  gave Xanax to pt and she was able to sleep the remainder of the night. When she woke up in the morning she began to complain of the pain again and was unable to find a comfortable position. He gave her another Xanax but this did not seem to help the pain this time.  states she has a "very bad bed wound" since January 2016. She has been seen by wound care and has had debridement of the wounds. She has also been in several nursing facilities until recently, pt was on hospice until 1 month ago. Pt. did not want to answer any questions at time of exam. History obtained from . 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 In the ED: H/H: 8/25.8, BUN 47, albumin 2.8, procalcitonin .14, Lactate 1, UA: turbid, moderate LE, 11-25 WBC, pH 9, 3-5 RBC, TNTC bacteria. CT head: No acute intracranial pathology. CXR: no active disease. CT abdomen/pelvis: Bladder appearance consistent with cystitis in the appropriate clinical setting. Correlate with symptomatology and urinalysis. Deep sacral ulcer possibly extending to bone. Osteomyelitis not excluded. Correlate with bone scan or MR. admitted with intractable back pain admitted with sacral osteomyelitis.     with  Sacral ulcer stage4     Pt has stage IV ulcer (bone visible) - clinically acute  osteomyelitis sacral wound . Pt was initially treated with Zosyn and vanco for sacral osteo which was changed to Cefepime as per cult report grm neg jl . Pt got a PICC for 6 week abx course. Pt to continue taking cefepime until august 2nd. Sacral wound culture grew rare bacillus. Wound care consulted - [facility] silver alginate rope packing changed every other day, VAC, specialty bed. Pain was control with tylenol for mild, percocet 1 (moderate), percocet 2 (severe). Pt was rotated in bed frequently and blood cultures negative. Pt developed DERECK, Cr 1.4 from .96, likely 2/2 vanco and recent iv contrast exposure  which is d/c as well as the IV contrast. IVF increased to 75ml/hr. Nephrology consulted -[facility]. Through out the hospital course DERECK resolved and Cr became normal.     Pt hac chronic anemia, h/h need one unit PRBC and H/H went up to 8.5/25.7 from 6.4/20.2 s/p 1 unit PRBC. Pt has chronic anemia, baseline as of july 2016 it was similar at 7.3/23.1. MS was controlled with pain control and pt has a chronic canseco. Cystitis was ruled out no uti as culture showed more than 3 organisms, likely contamination.  Hypertension was controlled with Metoprolol with holds. Diabetes mellitus was stable. Metformin was dc due to low bs  and sugars were controlled without metformin, no insulin needed during hospital course. Malnutrition was controlled with PO Glucerna Shake® . Depression was controlled with Effexor, Xanax prn.     plan jayde as per pt evaluation.     pmd dr harley notified/ pt son and  [facility] dc plan

## 2017-06-23 NOTE — CONSULT NOTE ADULT - SUBJECTIVE AND OBJECTIVE BOX
Patient is a 86y old  Female who presents with a chief complaint of severe back pain (20 Jun 2017 19:50)   Acute  renal failure.    HPI:  87 y/o f pmhx depression, DM, HLD, HTN, MS, ulcer low back/pelvis presented to the ED with severe back pain. Pt is lethargic and not able to speak,  at bedside.   awoke at midnight c/o back ache,  gave Xanax to pt and she was able to sleep the remainder of the night. When she woke up in the morning she began to complain of the pain again and was unable to find a comfortable position. He gave her another Xanax but this did not seem to help the pain this time.  states she has a "very bad bed wound" since January 2016. She has been seen by wound care and has had debridement of the wounds. She has also been in several nursing facilities until recently, pt was on hospice until 1 month ago. Pt. did not want to answer any questions at time of exam. History obtained from .    Renal consulted for DERECK. +Zosyn and vanco exposure. Vanco level 20. No urinry c/o. +Canseco in place now. +IV contrast exposure 2 days ago. No hypotensive episode. No rash. No hematuria. No hemoptysis.       PAST MEDICAL & SURGICAL HISTORY:  Open wound of lower back and pelvis  Depression  Hypercholesteremia  Hypertension  Diabetes mellitus  MS (multiple sclerosis)  S/P ORIF (open reduction internal fixation) fracture: right lower leg  H/O oophorectomy  H/O abdominal hysterectomy   DM 2, MO, hypothyroidism, prior DVT and IVC filter; Cholecystectomy    FAMILY HISTORY:  Family history of breast cancer in sister (Sibling)  Family history of heart attack  Family history of cancer in mother: unknown type  No pertinent family history  [place]    Social History:Non smoker    MEDICATIONS  (STANDING):  venlafaxine 75milliGRAM(s) Oral at bedtime  polyethylene glycol 3350 17Gram(s) Oral every 24 hours  enoxaparin Injectable 40milliGRAM(s) SubCutaneous daily  metoprolol succinate ER 50milliGRAM(s) Oral daily  cefepime  IVPB  IV Intermittent   cefepime  IVPB 2000milliGRAM(s) IV Intermittent every 12 hours  docusate sodium 100milliGRAM(s) Oral two times a day  sodium chloride 0.9%. 1000milliLiter(s) IV Continuous <Continuous>    MEDICATIONS  (PRN):  senna 2Tablet(s) Oral at bedtime PRN Constipation  acetaminophen   Tablet. 650milliGRAM(s) Oral every 6 hours PRN Mild Pain (1 - 3)  oxyCODONE  5 mG/acetaminophen 325 mG 1Tablet(s) Oral every 4 hours PRN Moderate Pain (4 - 6)  oxyCODONE  5 mG/acetaminophen 325 mG 2Tablet(s) Oral every 6 hours PRN Severe Pain (7 - 10)   Meds reviewed    Allergies    No Known Allergies    Intolerances         REVIEW OF SYSTEMS:  EYES: No eye pain, visual disturbances, or discharge  ENMT:  No difficulty hearing, tinnitus, vertigo; No sinus or throat pain  NECK: No pain or stiffness  BREASTS: No pain, masses, or nipple discharge  RESPIRATORY: No SOB  CARDIOVASCULAR: No chest pain, palpitations, dizziness,   GASTROINTESTINAL: No abdominal or epigastric pain. No nausea, vomiting, or hematemesis  GENITOURINARY: No dysuria, frequency, hematuria, or incontinence  NEUROLOGICAL: No headaches, memory loss, loss of strength, numbness, or tremors  SKIN: Diffuse erythema, no blisters  LYMPH NODES: No enlarged glands  ENDOCRINE: No heat or cold intolerance; No hair loss      Vital Signs Last 24 Hrs  T(C): 36.8, Max: 37 (06-22 @ 22:13)  T(F): 98.2, Max: 98.6 (06-22 @ 22:13)  HR: 93 (89 - 103)  BP: 135/75 (94/56 - 135/75)  BP(mean): --  RR: 17 (16 - 17)  SpO2: 100% (97% - 100%)  Daily     Daily     PHYSICAL EXAM:    GENERAL: NAD  HEAD:  Atraumatic, Normocephalic  EYES: EOMI, PERRLA, conjunctiva and sclera clear  ENMT: No tonsillar erythema, exudates, or enlargement; Moist mucous membranes, Good dentition, No lesions  NERVOUS SYSTEM:  Alert & Oriented X3,  CHEST/LUNG: Clear to percussion bilaterally; No rales, rhonchi, wheezing, or rubs  HEART: Regular rate and rhythm; No murmurs, rubs, or gallops  ABDOMEN: Soft, Nontender, Nondistended; Bowel sounds present, body wall and flank edema  EXTREMITIES:  No edema   LYMPH: No lymphadenopathy noted  SKIN: No rashes or lesions, pale      LABS:                        8.4    8.2   )-----------( 510      ( 23 Jun 2017 09:24 )             26.4     06-23    140  |  109<H>  |  47<H>  ----------------------------<  85  4.2   |  23  |  1.40<H>    Ca    7.9<L>      23 Jun 2017 09:24        A/P:  DERECK  - With AKIN criteria  - It's difficult to tell with one day risk of Cr with DERECK. DERECK previous risk factors includes Vanco induced direct DERECK, Zosyn induced AIN, or contrast induced KAILASH  - We will check FeNa to evaluate volume status. Possible contribution from pre-renal picture Continue IVF  - Urine for eosinophil to evaluate possible AIN with zosyn.   - No role for mucomyst 48 hours past contrast exposure  - Can hold metformin. Metformin does not cause DERECK, it rather increase the risk of type B lactic acidosis in patient with CKD/DERECK.   - Keep canseco for today     Sacral ulcer  DM  HTN    Thank you

## 2017-06-23 NOTE — DISCHARGE NOTE ADULT - CARE PROVIDER_API CALL
Shahbaz Villanueva), Internal Medicine  [address]  [place] [address]  Phone: [phone]  Fax: [phone]    Edouard Maddox), [facility]  [address] [place] [id number]  Phone: [phone]  Fax: [phone]

## 2017-06-23 NOTE — PROGRESS NOTE ADULT - PROBLEM SELECTOR PLAN 2
Cr 1.4 from .9  likely 2/2 vanco which is d/c as well as the IV contrast  IVF increased to 75ml/hr  will monitor Cr Cr 1.4 from .96  likely 2/2 vanco which is d/c as well as the IV contrast  IVF increased to 75ml/hr  will monitor Cr Cr 1.4 from .96  likely 2/2 vanco which is d/c as well as the IV contrast  IVF increased to 75ml/hr  Nephrology consulted  will monitor Cr Cr 1.4 from .96  likely 2/2 vanco which is d/c as well as the IV contrast  IVF increased to 75ml/hr  Nephrology consulted-[facility]  will monitor Cr

## 2017-06-23 NOTE — DISCHARGE NOTE ADULT - ADDITIONAL INSTRUCTIONS
Continue IV abx, cefepime 2g q 12 until august 2nd  f/u outpt with PMD Dr. Villanueva  Stop taking metformin due to low sugars and risk of kidney injury -6 weeks Cefepime 2grams IV q12 so until August 2nd with weekly CBC, BMP, ESR drawn .  f/u outpt with PMD Dr. Villanueva/ notified  , id dr reynaga office in 2 wk .   Stop taking metformin due to low sugars . resume meds as needed  as per hb a1c and fu bs , for now continue dm diet. for sacral wound silver alginate rope packing changed every other day, VAC, specialty bed.

## 2017-06-23 NOTE — PROGRESS NOTE ADULT - PROBLEM SELECTOR PLAN 1
- CT abd/pelvis - Deep sacral ulcer possibly extending to bone. Clinically it is osteo as bone is visible - stage 4   - wound culture grew rare bacillus  - wbc decreased to 7.1  -ID consulted, Dr. Voss, - Pt to get PICC for long term abx as per ID for osteo - 6 weeks Cefepime 2grams IV q12 so until August 2nd. Vanco and zosyn d/c  -Wound care consulted - [facility] silver alginate rope packing changed every other day, VAC, specialty bed.  -Pain control with tylenol for mild, percocet 1 (moderate), percocet 2 (severe)  -Rotate in bed frequently  - blood cultures negative - CT abd/pelvis - Deep sacral ulcer possibly extending to bone. Clinically it is osteo as bone is visible - stage 4   - wound culture grew rare bacillus  - wbc 8.2  -ID consulted, Dr. Voss, - Pt to get PICC for long term abx as per ID for osteo - 6 weeks Cefepime 2grams IV q12 so until August 2nd. Vanco and zosyn d/c  -Wound care consulted - [facility] silver alginate rope packing changed every other day, VAC, specialty bed.  -Pain control with tylenol for mild, percocet 1 (moderate), percocet 2 (severe)  -Rotate in bed frequently  - blood cultures negative

## 2017-06-23 NOTE — PHYSICAL THERAPY INITIAL EVALUATION ADULT - PREDICTED DURATION OF THERAPY (DAYS/WKS), PT EVAL
Pt is not appropriate for PT at this time. Pt with c/o leg and arm pain when attempting to check range of motion on left arm and bilateral legs

## 2017-06-23 NOTE — PROGRESS NOTE ADULT - SUBJECTIVE AND OBJECTIVE BOX
Interventional Radiology Pre-Procedure Note    This is a 86y Female with osteomyelitis requiring long term iv abx.    Procedure: PICC Placement    PAST MEDICAL & SURGICAL HISTORY:  Open wound of lower back and pelvis  Depression  Hypercholesteremia  Hypertension  Diabetes mellitus  MS (multiple sclerosis)  S/P ORIF (open reduction internal fixation) fracture: right lower leg  H/O oophorectomy  H/O abdominal hysterectomy       Allergies: No Known Allergies      LABS:                        8.4    8.2   )-----------( 510      ( 23 Jun 2017 09:24 )             26.4     06-23    140  |  109<H>  |  47<H>  ----------------------------<  85  4.2   |  23  |  1.40<H>    Ca    7.9<L>      23 Jun 2017 09:24        PLAN: PICC placement for iv abx    Procedure/ risks/ benefits were explained, informed consent obtained from patient, verbalizes understanding.

## 2017-06-23 NOTE — DISCHARGE NOTE ADULT - PATIENT PORTAL LINK FT
“You can access the [facility] Patient Portal, offered by [facility], by registering with the following website: [email or web address]”

## 2017-06-23 NOTE — PROGRESS NOTE ADULT - PROBLEM SELECTOR PLAN 3
- h/h now 6.2/19.9 from 7.2/ 23.0 yesterday - stat h/h ordered- 6.4/20.2  - 1 unit PRBC ordered - will f/u h/h 18:30   -  baseline as of july 2016 it was similar at 7.3/23.1  will monitor - h/h now 8.5/25.7 from 6.4/20.2 s/p 1 unit PRBC  -  baseline as of july 2016 it was similar at 7.3/23.1  will monitor

## 2017-06-23 NOTE — PHYSICAL THERAPY INITIAL EVALUATION ADULT - ADDITIONAL COMMENTS
Pt was dependent for all her needs prior to admission and has a hospital bed. It was discussed at one east rounds that pt has not walked in one year. Pt was on home hospice for 6 months. Pt has a home health aide. Pt is right hand dominant.

## 2017-06-23 NOTE — DISCHARGE NOTE ADULT - MEDICATION SUMMARY - MEDICATIONS TO CHANGE
I will SWITCH the dose or number of times a day I take the medications listed below when I get home from the hospital:  None I will SWITCH the dose or number of times a day I take the medications listed below when I get home from the hospital:    heparin  -- 5000 unit(s) subcutaneous every 12 hours

## 2017-06-23 NOTE — PROVIDER CONTACT NOTE (CHANGE IN STATUS NOTIFICATION) - RECOMMENDATIONS
Continue current plan of care with NPWT apply Cavilon skin protection to periwound with dressing change

## 2017-06-23 NOTE — PHYSICAL THERAPY INITIAL EVALUATION ADULT - PERTINENT HX OF CURRENT PROBLEM, REHAB EVAL
Pt with PMH of DM, HLD,HTN, MS, cystitis open wounds on low back and pelvis was admitted with severe back pain

## 2017-06-23 NOTE — CHART NOTE - NSCHARTNOTEFT_GEN_A_CORE
Upon Nutritional Assessment by the Registered Dietitian your patient was determined to meet criteria / has evidence of the following diagnosis/diagnoses:          [ ]  Mild Protein Calorie Malnutrition        [x ]  Moderate Protein Calorie Malnutrition        [ ] Severe Protein Calorie Malnutrition        [ ] Unspecified Protein Calorie Malnutrition        [ ] Underweight / BMI <19        [ ] Morbid Obesity / BMI > 40      Findings as based on:  •  Comprehensive nutrition assessment and consultation  •  Calorie counts (nutrient intake analysis)  •  Food acceptance and intake status from observations by staff  •  Follow up  •  Patient education  •  Intervention secondary to interdisciplinary rounds  •   concerns      Treatment:    The following diet has been recommended:  add glucerna TID, MVI and Vit C 500mg BID    PROVIDER Section:     By signing this assessment you are acknowledging and agree with the diagnosis/diagnoses assigned by the Registered Dietitian    Comments:

## 2017-06-23 NOTE — DISCHARGE NOTE ADULT - CARE PLAN
Principal Discharge DX:	Sacral ulcer, with unspecified severity  Goal:	stable  Instructions for follow-up, activity and diet:	Continue IV abx, cefepime 2g q 12 until august 2nd  continue with wound vac and frequent rotations   f/u outpt with PMD Dr. Villanueva  follow reccs of rehab  pain management as above  Secondary Diagnosis:	DERECK (acute kidney injury)  Goal:	resolved  Secondary Diagnosis:	Depression  Goal:	stable  Instructions for follow-up, activity and diet:	continue home meds as above  Secondary Diagnosis:	Diabetes mellitus  Goal:	stable  Instructions for follow-up, activity and diet:	Stop taking metformin due to low sugars and risk of kidney injury  f/u outpt with PMD Dr. Villanueva  Secondary Diagnosis:	Hypertension  Goal:	stable  Instructions for follow-up, activity and diet:	continue home meds as above  Secondary Diagnosis:	Hypercholesteremia  Goal:	stable Principal Discharge DX:	Sacral ulcer, with unspecified severity  Goal:	stable  Instructions for follow-up, activity and diet:	Continue IV abx, cefepime 2g q 12 until august 2nd / for acute om   continue with wound vac and frequent rotations   f/u outpt with PMD Dr. Villanueva  follow reccs of rehab  pain management as above  Secondary Diagnosis:	DERECK (acute kidney injury)  Goal:	resolved  Instructions for follow-up, activity and diet:	avoid nephrotoxic agent  Secondary Diagnosis:	Depression  Goal:	stable  Instructions for follow-up, activity and diet:	continue home meds as above  Secondary Diagnosis:	Diabetes mellitus  Goal:	stable  Instructions for follow-up, activity and diet:	Stop taking metformin due to low sugars and risk of kidney injury  f/u outpt with PMD Dr. Villanueva  Secondary Diagnosis:	Hypertension  Goal:	stable  Instructions for follow-up, activity and diet:	continue meds as above  Secondary Diagnosis:	Hypercholesteremia  Goal:	stable  Instructions for follow-up, activity and diet:	on meds

## 2017-06-23 NOTE — DISCHARGE NOTE ADULT - PLAN OF CARE
stable Continue IV abx, cefepime 2g q 12 until august 2nd  continue with wound vac and frequent rotations   f/u outpt with PMD Dr. Villanueva  follow reccs of rehab  pain management as above resolved continue home meds as above Stop taking metformin due to low sugars and risk of kidney injury  f/u outpt with PMD Dr. Villanueva avoid nephrotoxic agent on meds Continue IV abx, cefepime 2g q 12 until august 2nd / for acute om   continue with wound vac and frequent rotations   f/u outpt with PMD Dr. Villanueva  follow reccs of rehab  pain management as above continue meds as above

## 2017-06-23 NOTE — PROGRESS NOTE ADULT - SUBJECTIVE AND OBJECTIVE BOX
HPI:  85 y/o f pmhx depression, DM, HLD, HTN, MS, ulcer low back/pelvis presented to the ED with severe back pain. Pt has also been in several nursing facilities until recently, pt was on hospice until 1 month ago. Pt. Deep sacral ulcer possibly extending to bone. Pt has stage IV ulcer (bone visible) - clinically osteomyelitis. Continue Zosyn and vanco d/c after 3 days and pt was started on cefepime to continue for a course of 6 weeks for sacral osteo. Pt states pain is worse better and she has no overnight events. Pain meds are increased and will be monitored Pt to go for PICC today for long term abx.    REVIEW OF SYSTEMS:    CONSTITUTIONAL: + weakness, denies fevers or chills  EYES/ENT: No visual changes, no throat pain   RESPIRATORY: No cough, wheezing, hemoptysis; No shortness of breath  CARDIOVASCULAR: No chest pain or palpitations  GASTROINTESTINAL: No abdominal, nausea, vomiting, or hematemesis; No diarrhea or constipation. No melena or hematochezia.  GENITOURINARY: No dysuria, frequency or hematuria  NEUROLOGICAL: No dizziness, numbness, or weakness  SKIN: + mild pain on back where sore is, No itching, burning,  All other review of systems is negative unless indicated above.    Vital Signs Last 24 Hrs  T(C): 36.5, Max: 37 (06-22 @ 22:13)  T(F): 97.7, Max: 98.6 (06-22 @ 22:13)  HR: 100 (89 - 103)  BP: 121/73 (94/56 - 126/76)  BP(mean): --  RR: 16 (16 - 16)  SpO2: 98% (97% - 99%)    PHYSICAL EXAM:     GENERAL: pale, frail, cachectic female, no acute distress  HEENT: + conjunctival pallor, NC/AT, EOMI, neck supple, MMM  RESPIRATORY: decreased breath sounds b/l, no rhonchi, rales, or wheezing  CARDIOVASCULAR: RRR, no murmurs, gallops, rubs  ABDOMINAL: soft, non-tender, non-distended, positive bowel sounds   EXTREMITIES: + 1 pitting edema in b/l legs, no clubbing, cyanosis  NEUROLOGICAL: alert and oriented x 3, non-focal  SKIN: stage 4 pressure ulcer on sacrum, bone visible  with packing in place   MUSCULOSKELETAL: no gross joint deformity   - Chronic canseco    LABS:                                     6.2    7.1   )-----------( 507      ( 2017 08:37 )             19.9         140  |  108  |  42<H>  ----------------------------<  121<H>  4.2   |  25  |  0.96    Ca    7.9<L>      2017 08:37    TPro  6.5  /  Alb  2.8<L>  /  TBili  0.1<L>  /  DBili  x   /  AST  14<L>  /  ALT  12  /  AlkPhos  51  20    PT/INR - ( 2017 17:35 )   PT: 11.8 sec;   INR: 1.08 ratio         PTT - ( 2017 17:35 )  PTT:33.3 sec  Urinalysis Basic - ( 2017 19:09 )    Color: Yellow / Appearance: Turbid / S.010 / pH: x  Gluc: x / Ketone: Negative  / Bili: Small / Urobili: Negative   Blood: x / Protein: 75 mg/dL / Nitrite: Negative   Leuk Esterase: Moderate / RBC: 3-5 /HPF / WBC 11-25   Sq Epi: x / Non Sq Epi: Occasional / Bacteria: [facility]     @ 22:18   Culture grew 3 or more types of organisms which indicate  collection contamination; consider recollection only if clinically  indicated.  --  --   @ 21:46   No growth to date.  --  --   @ 21:43   No growth to date.  --  --        CAPILLARY BLOOD GLUCOSE  130 (2017 11:16)  161 (2017 08:45)      MEDICATIONS  (STANDING):  metoprolol succinate ER 50milliGRAM(s) Oral daily  venlafaxine 75milliGRAM(s) Oral at bedtime  polyethylene glycol 3350 17Gram(s) Oral every 24 hours  enoxaparin Injectable 40milliGRAM(s) SubCutaneous daily  sodium chloride 0.9%. 1000milliLiter(s) IV Continuous <Continuous>  vancomycin  IVPB 1000milliGRAM(s) IV Intermittent every 12 hours  piperacillin/tazobactam IVPB. 3.375Gram(s) IV Intermittent every 8 hours      Radiology:    CT head: No acute intracranial pathology.  CXR: no active disease  CT abdomen/pelvis: Bladder appearance consistent with cystitis in the appropriate clinical setting. Correlate with symptomatology and urinalysis. Deep sacral ulcer possibly extending to bone. Osteomyelitis not excluded. Correlate with bone scan or MR. HPI:  87 y/o f pmhx depression, DM, HLD, HTN, MS, ulcer low back/pelvis presented to the ED with severe back pain. Pt has also been in several nursing facilities until recently, pt was on hospice until 1 month ago. Pt. Deep sacral ulcer possibly extending to bone. Pt has stage IV ulcer (bone visible) - clinically osteomyelitis. Continue Zosyn and vanco d/c after 3 days and pt was started on cefepime to continue for a course of 6 weeks for sacral osteo. Pt states pain is worse better and she has no overnight events. Pain meds are increased and will be monitored. Pt to go for PICC today for long term abx. Cr now 1.4 from .96 likely 2/2 vanco which is d/c as well as the IV contrast, IVF increased will monitor Cr. H/H was low yesterday , pt s/p 1 unit PRBC, H/H now stable.    REVIEW OF SYSTEMS:    CONSTITUTIONAL: + weakness, denies fevers or chills  EYES/ENT: No visual changes, no throat pain   RESPIRATORY: No cough, wheezing, hemoptysis; No shortness of breath  CARDIOVASCULAR: No chest pain or palpitations  GASTROINTESTINAL: No abdominal, nausea, vomiting, or hematemesis; No diarrhea or constipation. No melena or hematochezia.  GENITOURINARY: No dysuria, frequency or hematuria  NEUROLOGICAL: No dizziness, numbness, or weakness  SKIN: + severe/moderate pain on back where sore is, No itching, burning,  All other review of systems is negative unless indicated above.    Vital Signs Last 24 Hrs  T(C): 36.5, Max: 37 (06-22 @ 22:13)  T(F): 97.7, Max: 98.6 (06-22 @ 22:13)  HR: 100 (89 - 103)  BP: 121/73 (94/56 - 126/76)  BP(mean): --  RR: 16 (16 - 16)  SpO2: 98% (97% - 99%)    PHYSICAL EXAM:     GENERAL: pale, frail, cachectic female, no acute distress  HEENT: + conjunctival pallor, NC/AT, EOMI, neck supple, MMM  RESPIRATORY: decreased breath sounds b/l, no rhonchi, rales, or wheezing  CARDIOVASCULAR: RRR, no murmurs, gallops, rubs  ABDOMINAL: soft, non-tender, non-distended, positive bowel sounds   EXTREMITIES: + 1 pitting edema in b/l legs, no clubbing, cyanosis  NEUROLOGICAL: alert and oriented x 3, non-focal  SKIN: stage 4 pressure ulcer on sacrum, bone visible  with packing in place   MUSCULOSKELETAL: no gross joint deformity   - Chronic canseco    LABS:                                                           8.4    8.2   )-----------( 510      ( 23 Jun 2017 09:24 )             26.4     06-23    140  |  109<H>  |  47<H>  ----------------------------<  85  4.2   |  23  |  1.40<H>    Ca    7.9<L>      23 Jun 2017 09:24        06-22 @ 04:25   Rare Yeast  Rare Bacillus species not anthracis  --  --  06-20 @ 22:18   Culture grew 3 or more types of organisms which indicate  collection contamination; consider recollection only if clinically  indicated.  --  --  06-20 @ 21:46   No growth to date.  --  --  06-20 @ 21:43   No growth to date.  --  --        CAPILLARY BLOOD GLUCOSE  130 (21 Jun 2017 11:16)  161 (21 Jun 2017 08:45)      MEDICATIONS  (STANDING):  metoprolol succinate ER 50milliGRAM(s) Oral daily  venlafaxine 75milliGRAM(s) Oral at bedtime  polyethylene glycol 3350 17Gram(s) Oral every 24 hours  enoxaparin Injectable 40milliGRAM(s) SubCutaneous daily  sodium chloride 0.9%. 1000milliLiter(s) IV Continuous <Continuous>  vancomycin  IVPB 1000milliGRAM(s) IV Intermittent every 12 hours  piperacillin/tazobactam IVPB. 3.375Gram(s) IV Intermittent every 8 hours      Radiology:    CT head: No acute intracranial pathology.  CXR: no active disease  CT abdomen/pelvis: Bladder appearance consistent with cystitis in the appropriate clinical setting. Correlate with symptomatology and urinalysis. Deep sacral ulcer possibly extending to bone. Osteomyelitis not excluded. Correlate with bone scan or MR. HPI:  87 y/o f pmhx depression, DM, HLD, HTN, MS, ulcer low back/pelvis presented to the ED with severe back pain. Pt has also been in several nursing facilities until recently, pt was on hospice until 1 month ago. Pt. Deep sacral ulcer possibly extending to bone. Pt has stage IV ulcer (bone visible) - clinically osteomyelitis. Continue Zosyn and vanco d/c after 3 days and pt was started on cefepime to continue for a course of 6 weeks for sacral osteo. Pt states pain is worse better and she has no overnight events. Pain meds are increased and will be monitored. Pt to go for PICC today for long term abx. Cr now 1.4 from .96 likely 2/2 vanco which is d/c as well as the IV contrast, IVF increased will monitor Cr, Nephrology consulted. H/H was low yesterday , pt s/p 1 unit PRBC, H/H now stable.    REVIEW OF SYSTEMS:    CONSTITUTIONAL: + weakness, denies fevers or chills  EYES/ENT: No visual changes, no throat pain   RESPIRATORY: No cough, wheezing, hemoptysis; No shortness of breath  CARDIOVASCULAR: No chest pain or palpitations  GASTROINTESTINAL: No abdominal, nausea, vomiting, or hematemesis; No diarrhea or constipation. No melena or hematochezia.  GENITOURINARY: No dysuria, frequency or hematuria  NEUROLOGICAL: No dizziness, numbness, or weakness  SKIN: + severe/moderate pain on back where sore is, No itching, burning,  All other review of systems is negative unless indicated above.    Vital Signs Last 24 Hrs  T(C): 36.5, Max: 37 (06-22 @ 22:13)  T(F): 97.7, Max: 98.6 (06-22 @ 22:13)  HR: 100 (89 - 103)  BP: 121/73 (94/56 - 126/76)  BP(mean): --  RR: 16 (16 - 16)  SpO2: 98% (97% - 99%)    PHYSICAL EXAM:     GENERAL: pale, frail, cachectic female, no acute distress  HEENT: + conjunctival pallor, NC/AT, EOMI, neck supple, MMM  RESPIRATORY: decreased breath sounds b/l, no rhonchi, rales, or wheezing  CARDIOVASCULAR: RRR, no murmurs, gallops, rubs  ABDOMINAL: soft, non-tender, non-distended, positive bowel sounds   EXTREMITIES: + 1 pitting edema in b/l legs, no clubbing, cyanosis  NEUROLOGICAL: alert and oriented x 3, non-focal  SKIN: stage 4 pressure ulcer on sacrum, bone visible  with packing in place   MUSCULOSKELETAL: no gross joint deformity   - Chronic canseco    LABS:                                                           8.4    8.2   )-----------( 510      ( 23 Jun 2017 09:24 )             26.4     06-23    140  |  109<H>  |  47<H>  ----------------------------<  85  4.2   |  23  |  1.40<H>    Ca    7.9<L>      23 Jun 2017 09:24        06-22 @ 04:25   Rare Yeast  Rare Bacillus species not anthracis  --  --  06-20 @ 22:18   Culture grew 3 or more types of organisms which indicate  collection contamination; consider recollection only if clinically  indicated.  --  --  06-20 @ 21:46   No growth to date.  --  --  06-20 @ 21:43   No growth to date.  --  --        CAPILLARY BLOOD GLUCOSE  130 (21 Jun 2017 11:16)  161 (21 Jun 2017 08:45)      MEDICATIONS  (STANDING):  metoprolol succinate ER 50milliGRAM(s) Oral daily  venlafaxine 75milliGRAM(s) Oral at bedtime  polyethylene glycol 3350 17Gram(s) Oral every 24 hours  enoxaparin Injectable 40milliGRAM(s) SubCutaneous daily  sodium chloride 0.9%. 1000milliLiter(s) IV Continuous <Continuous>  vancomycin  IVPB 1000milliGRAM(s) IV Intermittent every 12 hours  piperacillin/tazobactam IVPB. 3.375Gram(s) IV Intermittent every 8 hours      Radiology:    CT head: No acute intracranial pathology.  CXR: no active disease  CT abdomen/pelvis: Bladder appearance consistent with cystitis in the appropriate clinical setting. Correlate with symptomatology and urinalysis. Deep sacral ulcer possibly extending to bone. Osteomyelitis not excluded. Correlate with bone scan or MR. HPI:  87 y/o f pmhx depression, DM, HLD, HTN, MS, ulcer low back/pelvis presented to the ED with severe back pain. Pt has also been in several nursing facilities until recently, pt was on hospice until 1 month ago. Pt. Deep sacral ulcer possibly extending to bone. Pt has stage IV ulcer (bone visible) - clinically osteomyelitis. Continue Zosyn and vanco d/c after 3 days and pt was started on cefepime to continue for a course of 6 weeks for sacral osteo. Pt states pain is worse better and she has no overnight events. Pain meds are increased and will be monitored. Pt to go for PICC today for long term abx. Cr now 1.4 from .96 likely 2/2 vanco which is d/c as well as the IV contrast, IVF increased will monitor Cr, Nephrology consulted. H/H was low yesterday , pt s/p 1 unit PRBC, H/H now stable.    REVIEW OF SYSTEMS:    CONSTITUTIONAL: + weakness, denies fevers or chills  EYES/ENT: No visual changes, no throat pain   RESPIRATORY: No cough, wheezing, hemoptysis; No shortness of breath  CARDIOVASCULAR: No chest pain or palpitations  GASTROINTESTINAL: No abdominal, nausea, vomiting, or hematemesis; No diarrhea or constipation. No melena or hematochezia.  GENITOURINARY: No dysuria, frequency or hematuria  NEUROLOGICAL: No dizziness, numbness, or weakness  SKIN: + severe/moderate pain on back where sore is, No itching, burning,  All other review of systems is negative unless indicated above.    Vital Signs Last 24 Hrs  T(C): 36.5, Max: 37 (06-22 @ 22:13)  T(F): 97.7, Max: 98.6 (06-22 @ 22:13)  HR: 100 (89 - 103)  BP: 121/73 (94/56 - 126/76)  BP(mean): --  RR: 16 (16 - 16)  SpO2: 98% (97% - 99%)    PHYSICAL EXAM:     GENERAL: pale, frail, cachectic female, no acute distress  HEENT: + conjunctival pallor, NC/AT, EOMI, neck supple, MMM  RESPIRATORY: decreased breath sounds b/l, no rhonchi, rales, or wheezing  CARDIOVASCULAR: RRR, no murmurs, gallops, rubs  ABDOMINAL: soft, non-tender, non-distended, positive bowel sounds   EXTREMITIES: + 1 pitting edema in b/l legs, no clubbing, cyanosis  NEUROLOGICAL: alert and oriented x 3, non-focal  SKIN: stage 4 pressure ulcer on sacrum, bone visible , + wound vac in place   - Chronic canseco    LABS:                                                           8.4    8.2   )-----------( 510      ( 23 Jun 2017 09:24 )             26.4     06-23    140  |  109<H>  |  47<H>  ----------------------------<  85  4.2   |  23  |  1.40<H>    Ca    7.9<L>      23 Jun 2017 09:24        06-22 @ 04:25   Rare Yeast  Rare Bacillus species not anthracis  --  --  06-20 @ 22:18   Culture grew 3 or more types of organisms which indicate  collection contamination; consider recollection only if clinically  indicated.  --  --  06-20 @ 21:46   No growth to date.  --  --  06-20 @ 21:43   No growth to date.  --  --        CAPILLARY BLOOD GLUCOSE  130 (21 Jun 2017 11:16)  161 (21 Jun 2017 08:45)      MEDICATIONS  (STANDING):  metoprolol succinate ER 50milliGRAM(s) Oral daily  venlafaxine 75milliGRAM(s) Oral at bedtime  polyethylene glycol 3350 17Gram(s) Oral every 24 hours  enoxaparin Injectable 40milliGRAM(s) SubCutaneous daily  sodium chloride 0.9%. 1000milliLiter(s) IV Continuous <Continuous>  vancomycin  IVPB 1000milliGRAM(s) IV Intermittent every 12 hours  piperacillin/tazobactam IVPB. 3.375Gram(s) IV Intermittent every 8 hours      Radiology:    CT head: No acute intracranial pathology.  CXR: no active disease  CT abdomen/pelvis: Bladder appearance consistent with cystitis in the appropriate clinical setting. Correlate with symptomatology and urinalysis. Deep sacral ulcer possibly extending to bone. Osteomyelitis not excluded. Correlate with bone scan or MR. HPI:  87 y/o f pmhx depression, DM, HLD, HTN, MS, ulcer low back/pelvis presented to the ED with severe back pain. Pt has also been in several nursing facilities until recently. Pt. Deep sacral ulcer possibly extending to bone. Pt has stage IV ulcer (bone visible) - clinically osteomyelitis. Zosyn and vanco d/c after 3 days and pt was started on cefepime to continue for a course of 6 weeks for sacral osteo. Pt states pain is worse so pain meds were increased and will be monitored. Pt to go for PICC today for long term abx. Cr now 1.4 from .96 likely 2/2 vanco which is d/c as well as the IV contrast, IVF increased, will monitor Cr, Nephrology consulted. H/H was low yesterday , pt s/p 1 unit PRBC, H/H now stable.    REVIEW OF SYSTEMS:    CONSTITUTIONAL: + weakness, denies fevers or chills  EYES/ENT: No visual changes, no throat pain   RESPIRATORY: No cough, wheezing, hemoptysis; No shortness of breath  CARDIOVASCULAR: No chest pain or palpitations  GASTROINTESTINAL: No abdominal, nausea, vomiting, or hematemesis; No diarrhea or constipation. No melena or hematochezia.  GENITOURINARY: No dysuria, frequency or hematuria  NEUROLOGICAL: No dizziness, numbness, or weakness  SKIN: + severe/moderate pain on back where sore is, No itching, burning,  All other review of systems is negative unless indicated above.    Vital Signs Last 24 Hrs  T(C): 36.5, Max: 37 (06-22 @ 22:13)  T(F): 97.7, Max: 98.6 (06-22 @ 22:13)  HR: 100 (89 - 103)  BP: 121/73 (94/56 - 126/76)  BP(mean): --  RR: 16 (16 - 16)  SpO2: 98% (97% - 99%)    PHYSICAL EXAM:     GENERAL: pale, frail, cachectic female, no acute distress  HEENT: + conjunctival pallor, NC/AT, EOMI, neck supple, MMM  RESPIRATORY: decreased breath sounds b/l, no rhonchi, rales, or wheezing  CARDIOVASCULAR: RRR, no murmurs, gallops, rubs  ABDOMINAL: soft, non-tender, non-distended, positive bowel sounds   EXTREMITIES: + 1 pitting edema in b/l legs, no clubbing, cyanosis  NEUROLOGICAL: alert and oriented x 3, non-focal  SKIN: stage 4 pressure ulcer on sacrum, bone visible , + wound vac in place   - Chronic canseco    LABS:                                                           8.4    8.2   )-----------( 510      ( 23 Jun 2017 09:24 )             26.4     06-23    140  |  109<H>  |  47<H>  ----------------------------<  85  4.2   |  23  |  1.40<H>    Ca    7.9<L>      23 Jun 2017 09:24        06-22 @ 04:25   Rare Yeast  Rare Bacillus species not anthracis  --  --  06-20 @ 22:18   Culture grew 3 or more types of organisms which indicate  collection contamination; consider recollection only if clinically  indicated.  --  --  06-20 @ 21:46   No growth to date.  --  --  06-20 @ 21:43   No growth to date.  --  --        CAPILLARY BLOOD GLUCOSE  130 (21 Jun 2017 11:16)  161 (21 Jun 2017 08:45)      MEDICATIONS  (STANDING):  metoprolol succinate ER 50milliGRAM(s) Oral daily  venlafaxine 75milliGRAM(s) Oral at bedtime  polyethylene glycol 3350 17Gram(s) Oral every 24 hours  enoxaparin Injectable 40milliGRAM(s) SubCutaneous daily  sodium chloride 0.9%. 1000milliLiter(s) IV Continuous <Continuous>  vancomycin  IVPB 1000milliGRAM(s) IV Intermittent every 12 hours  piperacillin/tazobactam IVPB. 3.375Gram(s) IV Intermittent every 8 hours      Radiology:    CT head: No acute intracranial pathology.  CXR: no active disease  CT abdomen/pelvis: Bladder appearance consistent with cystitis in the appropriate clinical setting. Correlate with symptomatology and urinalysis. Deep sacral ulcer possibly extending to bone. Osteomyelitis not excluded. Correlate with bone scan or MR. HPI:  87 y/o f pmhx depression, DM, HLD, HTN, MS, ulcer low back/pelvis presented to the ED with severe back pain. Pt has also been in several nursing facilities until recently. Pt. Deep sacral ulcer possibly extending to bone. Pt has stage IV ulcer (bone visible) - clinically osteomyelitis. Zosyn and vanco d/c after 3 days and pt was started on cefepime to continue for a course of 6 weeks for sacral osteo. Pt states pain is worse so pain meds were increased and will be monitored. Pt to go for PICC today for long term abx. Cr now 1.4 from .96 likely 2/2 vanco which is d/c as well as the IV contrast, IVF increased, will monitor Cr, Nephrology consulted. H/H was low yesterday , pt s/p 1 unit PRBC, H/H now stable.    REVIEW OF SYSTEMS:    CONSTITUTIONAL: + weakness, denies fevers or chills  EYES/ENT: No visual changes, no throat pain   RESPIRATORY: No cough, wheezing, hemoptysis; No shortness of breath  CARDIOVASCULAR: No chest pain or palpitations  GASTROINTESTINAL: No abdominal, nausea, vomiting, or hematemesis; No diarrhea or constipation. No melena or hematochezia.  GENITOURINARY: No dysuria, frequency or hematuria  NEUROLOGICAL: No dizziness, numbness, or weakness  SKIN: + severe/moderate pain on back where sore is, No itching, burning,  All other review of systems is negative unless indicated above.    Vital Signs Last 24 Hrs  T(C): 36.5, Max: 37 (06-22 @ 22:13)  T(F): 97.7, Max: 98.6 (06-22 @ 22:13)  HR: 100 (89 - 103)  BP: 121/73 (94/56 - 126/76)  BP(mean): --  RR: 16 (16 - 16)  SpO2: 98% (97% - 99%)    PHYSICAL EXAM:     GENERAL: pale, frail, cachectic female, no acute distress  HEENT: + conjunctival pallor, NC/AT, EOMI, neck supple, MMM  RESPIRATORY: decreased breath sounds b/l, no rhonchi, rales, or wheezing  CARDIOVASCULAR: RRR, no murmurs, gallops, rubs  ABDOMINAL: soft, non-tender, non-distended, positive bowel sounds   EXTREMITIES: + 1 pitting edema in b/l legs, no clubbing, cyanosis  NEUROLOGICAL: alert and oriented x 3, non-focal  SKIN: + wound vac in place over sacral ulcer.   - Chronic canseco    LABS:                                                           8.4    8.2   )-----------( 510      ( 23 Jun 2017 09:24 )             26.4     06-23    140  |  109<H>  |  47<H>  ----------------------------<  85  4.2   |  23  |  1.40<H>    Ca    7.9<L>      23 Jun 2017 09:24        06-22 @ 04:25   Rare Yeast  Rare Bacillus species not anthracis  --  --  06-20 @ 22:18   Culture grew 3 or more types of organisms which indicate  collection contamination; consider recollection only if clinically  indicated.  --  --  06-20 @ 21:46   No growth to date.  --  --  06-20 @ 21:43   No growth to date.  --  --        CAPILLARY BLOOD GLUCOSE  130 (21 Jun 2017 11:16)  161 (21 Jun 2017 08:45)      MEDICATIONS  (STANDING):  metoprolol succinate ER 50milliGRAM(s) Oral daily  venlafaxine 75milliGRAM(s) Oral at bedtime  polyethylene glycol 3350 17Gram(s) Oral every 24 hours  enoxaparin Injectable 40milliGRAM(s) SubCutaneous daily  sodium chloride 0.9%. 1000milliLiter(s) IV Continuous <Continuous>  vancomycin  IVPB 1000milliGRAM(s) IV Intermittent every 12 hours  piperacillin/tazobactam IVPB. 3.375Gram(s) IV Intermittent every 8 hours      Radiology:    CT head: No acute intracranial pathology.  CXR: no active disease  CT abdomen/pelvis: Bladder appearance consistent with cystitis in the appropriate clinical setting. Correlate with symptomatology and urinalysis. Deep sacral ulcer possibly extending to bone. Osteomyelitis not excluded. Correlate with bone scan or MR.

## 2017-06-23 NOTE — PHYSICAL THERAPY INITIAL EVALUATION ADULT - ACTIVE RANGE OF MOTION EXAMINATION, REHAB EVAL
Right UE Active ROM was WFL (within functional limits)/deficits as listed below/pt unable to actively move left arm and bilateral legs

## 2017-06-23 NOTE — DISCHARGE NOTE ADULT - MEDICATION SUMMARY - MEDICATIONS TO STOP TAKING
I will STOP taking the medications listed below when I get home from the hospital:    metFORMIN 500 mg oral tablet, extended release  --  by mouth 2 times a day    ALPRAZolam 0.5 mg oral tablet  --  by mouth once a day, As Needed I will STOP taking the medications listed below when I get home from the hospital:    metFORMIN 500 mg oral tablet, extended release  --  by mouth 2 times a day    ALPRAZolam 0.5 mg oral tablet  --  by mouth once a day, As Needed    Diovan 160 mg oral tablet  -- 1 tab(s) by mouth once a day

## 2017-06-23 NOTE — DISCHARGE NOTE ADULT - MEDICATION SUMMARY - MEDICATIONS TO TAKE
I will START or STAY ON the medications listed below when I get home from the hospital:    acetaminophen 325 mg oral tablet  -- 2 tab(s) by mouth every 6 hours, As needed, Mild Pain  -- Indication: For Pain    acetaminophen-oxycodone 325 mg-5 mg oral tablet  -- 1 tab(s) by mouth every 4 hours, As needed, Moderate Pain (4 - 6)  -- Indication: For Pain    acetaminophen-oxycodone 325 mg-5 mg oral tablet  -- 2 tab(s) by mouth every 6 hours, As needed, Severe Pain (7 - 10)  -- Indication: For Pain    venlafaxine 37.5 mg oral tablet  -- 2 tab(s) by mouth once a day (at bedtime)  -- Indication: For Depression    Metoprolol Succinate ER 50 mg oral tablet, extended release  -- 1 tab(s) by mouth once a day  -- Indication: For HTN    cefepime  -- 2 gram(s) intravenous every 12 hours  -- Indication: For Sacral ulcer, with necrosis of muscle    Slow Fe  --  by mouth once a day  -- Indication: For Anemia     senna oral tablet  -- 2 tab(s) by mouth once a day (at bedtime), As needed, Constipation  -- Indication: For Constipation    polyethylene glycol 3350 oral powder for reconstitution  -- 17 gram(s) by mouth 2 times a day  -- Indication: For Constipation    docusate sodium 100 mg oral capsule  -- 1 cap(s) by mouth 2 times a day  -- Indication: For Constipation    ocular lubricant ophthalmic solution  -- 1 drop(s) to each affected eye once a day  -- Indication: For Dry eyes    multivitamin  --   once a day  -- Indication: For vitamin    ascorbic acid 500 mg oral tablet  -- 1 tab(s) by mouth once a day  -- Indication: For vitamin I will START or STAY ON the medications listed below when I get home from the hospital:    acetaminophen-oxycodone 325 mg-5 mg oral tablet  -- 1 tab(s) by mouth every 4 hours, As needed, Moderate Pain (4 - 6)  -- Indication: For back pain    acetaminophen-oxycodone 325 mg-5 mg oral tablet  -- 2 tab(s) by mouth every 6 hours, As needed, Severe Pain (7 - 10)  -- Indication: For  back Pain    acetaminophen 325 mg oral tablet  -- 2 tab(s) by mouth every 6 hours, As needed, Mild Pain  -- Indication: For back pain     enoxaparin  -- 40 milligram(s) subcutaneous once a day/ for dvt prophy as per further rehab evaluation  -- Indication: For Dvt prophy     venlafaxine 37.5 mg oral tablet  -- 2 tab(s) by mouth once a day (at bedtime)  -- Indication: For Depression    Metoprolol Succinate ER 50 mg oral tablet, extended release  -- 1 tab(s) by mouth once a day  -- Indication: For HTN    cefepime  -- 2 gram(s) intravenous every 12 hours  -- Indication: For Sacral ulcer, with necrosis of muscle / last dose august 2nd 2017    Slow Fe  --  by mouth once a day  -- Indication: For Anemia     senna oral tablet  -- 2 tab(s) by mouth once a day (at bedtime), As needed, Constipation  -- Indication: For Constipation    polyethylene glycol 3350 oral powder for reconstitution  -- 17 gram(s) by mouth 2 times a day  -- Indication: For Constipation    docusate sodium 100 mg oral capsule  -- 1 cap(s) by mouth 2 times a day  -- Indication: For Constipation    ocular lubricant ophthalmic solution  -- 1 drop(s) to each affected eye once a day  -- Indication: For Dry eyes    multivitamin  -- 1   once a day  -- Indication: For Need for prophylactic measure    ascorbic acid 500 mg oral tablet  -- 1 tab(s) by mouth once a day  -- Indication: For Need for prophylactic measure

## 2017-06-23 NOTE — PROVIDER CONTACT NOTE (CHANGE IN STATUS NOTIFICATION) - ASSESSMENT
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

## 2017-06-24 LAB
ANION GAP SERPL CALC-SCNC: 6 MMOL/L — SIGNIFICANT CHANGE UP (ref 5–17)
BUN SERPL-MCNC: 48 MG/DL — HIGH (ref 7–23)
CALCIUM SERPL-MCNC: 7.6 MG/DL — LOW (ref 8.5–10.1)
CHLORIDE SERPL-SCNC: 113 MMOL/L — HIGH (ref 96–108)
CO2 SERPL-SCNC: 23 MMOL/L — SIGNIFICANT CHANGE UP (ref 22–31)
CREAT ?TM UR-MCNC: 47 MG/DL — SIGNIFICANT CHANGE UP
CREAT SERPL-MCNC: 1.4 MG/DL — HIGH (ref 0.5–1.3)
CULTURE RESULTS: SIGNIFICANT CHANGE UP
EOSINOPHIL NFR URNS MANUAL: NEGATIVE — SIGNIFICANT CHANGE UP
GLUCOSE SERPL-MCNC: 72 MG/DL — SIGNIFICANT CHANGE UP (ref 70–99)
HBA1C BLD-MCNC: 4.8 % — SIGNIFICANT CHANGE UP (ref 4–5.6)
HCT VFR BLD CALC: 25.4 % — LOW (ref 34.5–45)
HGB BLD-MCNC: 8.1 G/DL — LOW (ref 11.5–15.5)
MCHC RBC-ENTMCNC: 32 GM/DL — SIGNIFICANT CHANGE UP (ref 32–36)
MCHC RBC-ENTMCNC: 32.9 PG — SIGNIFICANT CHANGE UP (ref 27–34)
MCV RBC AUTO: 102.9 FL — HIGH (ref 80–100)
OSMOLALITY UR: 364 MOS/KG — SIGNIFICANT CHANGE UP (ref 50–1200)
PLATELET # BLD AUTO: 476 K/UL — HIGH (ref 150–400)
POTASSIUM SERPL-MCNC: 4.8 MMOL/L — SIGNIFICANT CHANGE UP (ref 3.5–5.3)
POTASSIUM SERPL-SCNC: 4.8 MMOL/L — SIGNIFICANT CHANGE UP (ref 3.5–5.3)
POTASSIUM UR-SCNC: 30 MMOL/L — SIGNIFICANT CHANGE UP
RBC # BLD: 2.47 M/UL — LOW (ref 3.8–5.2)
RBC # FLD: 16.1 % — HIGH (ref 10.3–14.5)
SODIUM SERPL-SCNC: 142 MMOL/L — SIGNIFICANT CHANGE UP (ref 135–145)
SODIUM UR-SCNC: 38 MMOL/L — SIGNIFICANT CHANGE UP
SPECIMEN SOURCE: SIGNIFICANT CHANGE UP
URATE SERPL-MCNC: 5.4 MG/DL — SIGNIFICANT CHANGE UP (ref 2.5–7)
WBC # BLD: 9.3 K/UL — SIGNIFICANT CHANGE UP (ref 3.8–10.5)
WBC # FLD AUTO: 9.3 K/UL — SIGNIFICANT CHANGE UP (ref 3.8–10.5)

## 2017-06-24 PROCEDURE — 99233 SBSQ HOSP IP/OBS HIGH 50: CPT

## 2017-06-24 RX ADMIN — Medication 50 MILLIGRAM(S): at 05:27

## 2017-06-24 RX ADMIN — Medication 100 MILLIGRAM(S): at 18:24

## 2017-06-24 RX ADMIN — CEFEPIME 100 MILLIGRAM(S): 1 INJECTION, POWDER, FOR SOLUTION INTRAMUSCULAR; INTRAVENOUS at 18:24

## 2017-06-24 RX ADMIN — SODIUM CHLORIDE 75 MILLILITER(S): 9 INJECTION INTRAMUSCULAR; INTRAVENOUS; SUBCUTANEOUS at 05:27

## 2017-06-24 RX ADMIN — CEFEPIME 100 MILLIGRAM(S): 1 INJECTION, POWDER, FOR SOLUTION INTRAMUSCULAR; INTRAVENOUS at 05:27

## 2017-06-24 RX ADMIN — Medication 100 MILLIGRAM(S): at 05:27

## 2017-06-24 RX ADMIN — SODIUM CHLORIDE 75 MILLILITER(S): 9 INJECTION INTRAMUSCULAR; INTRAVENOUS; SUBCUTANEOUS at 20:41

## 2017-06-24 RX ADMIN — POLYETHYLENE GLYCOL 3350 17 GRAM(S): 17 POWDER, FOR SOLUTION ORAL at 20:41

## 2017-06-24 RX ADMIN — ENOXAPARIN SODIUM 40 MILLIGRAM(S): 100 INJECTION SUBCUTANEOUS at 12:52

## 2017-06-24 RX ADMIN — Medication 75 MILLIGRAM(S): at 21:31

## 2017-06-24 NOTE — PROGRESS NOTE ADULT - PROBLEM SELECTOR PLAN 1
- CT abd/pelvis - Deep sacral ulcer possibly extending to bone. 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 Voss, - Pt to get PICC for long term abx as per ID for osteo - 6 weeks Cefepime 2grams IV q12 so until August 2nd. Vanco and zosyn d/c  -Wound care consulted - recc silver alginate rope packing changed every other day, VAC, specialty bed.  -Pain control with tylenol for mild, percocet 1 (moderate), percocet 2 (severe)  -Rotate in bed frequently  - blood cultures negative

## 2017-06-24 NOTE — PROGRESS NOTE ADULT - SUBJECTIVE AND OBJECTIVE BOX
NEPHROLOGY INTERVAL HPI/OVERNIGHT EVENTS:  HPI:  87 y/o f pmhx depression, DM, HLD, HTN, MS, ulcer low back/pelvis presented to the ED with severe back pain. Pt is lethargic and not able to speak,  at bedside.   awoke at midnight c/o back ache,  gave Xanax to pt and she was able to sleep the remainder of the night. When she woke up in the morning she began to complain of the pain again and was unable to find a comfortable position. He gave her another Xanax but this did not seem to help the pain this time.  states she has a "very bad bed wound" since 2016. She has been seen by wound care and has had debridement of the wounds. She has also been in several nursing facilities until recently, pt was on hospice until 1 month ago. Pt. did not want to answer any questions at time of exam. History obtained from .    Renal consulted for DERECK. +Zosyn and vanco exposure. Vanco level 20. No urinry c/o. +Canseco in place now. +IV contrast exposure 2 days ago. No hypotensive episode. No rash. No hematuria. No hemoptysis.       In the ED:  H/H: 8/25.8, BUN 47, albumin 2.8, procalcitonin .14, Lactate 1, UA: turbid, moderate LE, 11-25 WBC, pH 9, 3-5 RBC, TNTC bacteria.     CT head: No acute intracranial pathology.  CXR: no active disease  CT abdomen/pelvis: Bladder appearance consistent with cystitis in the appropriate clinical setting. Correlate with symptomatology and urinalysis. Deep sacral ulcer possibly extending to bone. Osteomyelitis not excluded. Correlate with bone scan or MR.    Pt given 1x dose IV Zosyn, 1L bolus NS, Xanax, (2017 19:50)      PAST MEDICAL & SURGICAL HISTORY:  Open wound of lower back and pelvis  Depression  Hypercholesteremia  Hypertension  Diabetes mellitus  MS (multiple sclerosis)  S/P ORIF (open reduction internal fixation) fracture: right lower leg  H/O oophorectomy  H/O abdominal hysterectomy      MEDICATIONS  (STANDING):  venlafaxine 75milliGRAM(s) Oral at bedtime  polyethylene glycol 3350 17Gram(s) Oral every 24 hours  enoxaparin Injectable 40milliGRAM(s) SubCutaneous daily  metoprolol succinate ER 50milliGRAM(s) Oral daily  cefepime  IVPB  IV Intermittent   cefepime  IVPB 2000milliGRAM(s) IV Intermittent every 12 hours  docusate sodium 100milliGRAM(s) Oral two times a day  sodium chloride 0.9%. 1000milliLiter(s) IV Continuous <Continuous>    MEDICATIONS  (PRN):  senna 2Tablet(s) Oral at bedtime PRN Constipation  acetaminophen   Tablet. 650milliGRAM(s) Oral every 6 hours PRN Mild Pain (1 - 3)  oxyCODONE  5 mG/acetaminophen 325 mG 1Tablet(s) Oral every 4 hours PRN Moderate Pain (4 - 6)  oxyCODONE  5 mG/acetaminophen 325 mG 2Tablet(s) Oral every 6 hours PRN Severe Pain (7 - 10)      Allergies    No Known Allergies    Intolerances        Vital Signs Last 24 Hrs  T(C): 36.7, Max: 36.8 ( @ 13:46)  T(F): 98.1, Max: 98.2 ( @ 13:46)  HR: 90 (90 - 93)  BP: 125/72 (125/72 - 141/80)  BP(mean): --  RR: 16 (16 - 17)  SpO2: 99% (97% - 100%)  Daily     Daily     PHYSICAL EXAM:  GENERAL: NAD  HEAD:  Atraumatic, Normocephalic  EYES: EOMI, PERRLA, conjunctiva and sclera clear  ENMT: No tonsillar erythema, exudates, or enlargement; Moist mucous membranes, Good dentition, No lesions  NERVOUS SYSTEM:  Alert & Oriented X3,  CHEST/LUNG: Clear to percussion bilaterally; No rales, rhonchi, wheezing, or rubs  HEART: Regular rate and rhythm; No murmurs, rubs, or gallops  ABDOMEN: Soft, Nontender, Nondistended; Bowel sounds present, body wall and flank edema  EXTREMITIES:  No edema   LYMPH: No lymphadenopathy noted  SKIN: No rashes or lesions, pale      LABS:                        8.1    9.3   )-----------( 476      ( 2017 06:43 )             25.4     -    142  |  113<H>  |  48<H>  ----------------------------<    4.8   |  23  |  1.40<H>    Ca    7.6<L>      2017 06:43        Urinalysis Basic - ( 2017 16:16 )    Color: Yellow / Appearance: Clear / S.015 / pH: x  Gluc: x / Ketone: Negative  / Bili: Negative / Urobili: Negative   Blood: x / Protein: 75 mg/dL / Nitrite: Negative   Leuk Esterase: Moderate / RBC: 0-2 /HPF / WBC 6-10   Sq Epi: x / Non Sq Epi: Few / Bacteria: Few        A/P:  DERECK  - With AKIN criteria  - DERECK previous risk factors includes Vanco induced direct DERECK, Zosyn induced AIN, or contrast induced KAILASH  - Now Cr plateau, this suggest more ATN picture, and less AIN or pre-renal.   - Vanco and zosyn stopped. Continue IVF. No steroid indicated.   - FeNa does not suggest pure pre-renal  - Urine eosinophil negative   - No role for mucomyst 48 hours past contrast exposure  - Can resume metformin. Metformin does not cause DERECK, it rather increase the risk of type B lactic acidosis in patient with CKD/DERECK.   - Can d/c canseco in 24-48 hours    Sacral ulcer  DM  HTN    Thank you

## 2017-06-24 NOTE — PROGRESS NOTE ADULT - PROBLEM SELECTOR PLAN 3
-  s/p 1 unit PRBC stable hb/hct   -  baseline as of july 2016 it was similar at 7.3/23.1  will monitor

## 2017-06-24 NOTE — PROGRESS NOTE ADULT - SUBJECTIVE AND OBJECTIVE BOX
Patient is a 86y old  Female who presents with a chief complaint of severe back pain (2017 16:12)    · Subjective and Objective: 	  HPI:  87 y/o f pmhx depression, DM, HLD, HTN, MS, ulcer low back/pelvis presented to the ED with severe back pain. Pt has also been in several nursing facilities until recently. Pt. Deep sacral ulcer possibly extending to bone. Pt has stage IV ulcer (bone visible) - clinically osteomyelitis. Zosyn and vanco d/c after 3 days and pt was started on cefepime to continue for a course of 6 weeks for sacral osteo. Pt states pain is worse so pain meds were increased and will be monitored. Pt to go for PICC today for long term abx. Cr now 1.4 from .96 likely 2/2 vanco which is d/c as well as the IV contrast, IVF increased, will monitor Cr, Nephrology consulted. H/H was low possible anemia of chr dis  , pt s/p 1 unit PRBC, H/H now stable. increase oral hydration , pt c/o low appetite .   REVIEW OF SYSTEMS:    CONSTITUTIONAL: + weakness, denies fevers or chills  EYES/ENT: No visual changes, no throat pain   RESPIRATORY: No cough, wheezing, hemoptysis; No shortness of breath  CARDIOVASCULAR: No chest pain or palpitations  GASTROINTESTINAL: No abdominal, nausea, vomiting, or hematemesis; No diarrhea or constipation. No melena or hematochezia.  GENITOURINARY: No dysuria, frequency or hematuria  NEUROLOGICAL: No dizziness, numbness, or weakness  SKIN: + moderate pain on back where bed  sore is, No itching, burning,  All other review of systems is negative unless indicated above.    Vital Signs Last 24 Hrs  T(C): 36.7, Max: 36.8 (06-23 @ 13:46)  T(F): 98.1, Max: 98.2 (06-23 @ 13:46)  HR: 90 (90 - 93)  BP: 125/72 (125/72 - 141/80)  BP(mean): --  RR: 16 (16 - 17)  SpO2: 99% (97% - 100%)    PHYSICAL EXAM:     GENERAL: pale, frail, cachectic female, no acute distress  HEENT: + conjunctival pallor, NC/AT, EOMI, neck supple,  RESPIRATORY: decreased breath sounds b/l, no rhonchi, rales, or wheezing  CARDIOVASCULAR: RRR, no murmurs, gallops, rubs  ABDOMINAL: soft, non-tender,distended, positive bowel sounds   EXTREMITIES: + 1 pitting edema in b/l legs, no clubbing, cyanosis  NEUROLOGICAL: alert and oriented x 3, motor 4/5 low ext / sensation intact   SKIN: + wound vac in place over sacral ulcer satge4 . /rt arm picc line intact    - Chronic canseco    MEDICATIONS  (STANDING):  venlafaxine 75milliGRAM(s) Oral at bedtime  polyethylene glycol 3350 17Gram(s) Oral every 24 hours  enoxaparin Injectable 40milliGRAM(s) SubCutaneous daily  metoprolol succinate ER 50milliGRAM(s) Oral daily  cefepime  IVPB  IV Intermittent   cefepime  IVPB 2000milliGRAM(s) IV Intermittent every 12 hours  docusate sodium 100milliGRAM(s) Oral two times a day  sodium chloride 0.9%. 1000milliLiter(s) IV Continuous <Continuous>    MEDICATIONS  (PRN):  senna 2Tablet(s) Oral at bedtime PRN Constipation  acetaminophen   Tablet. 650milliGRAM(s) Oral every 6 hours PRN Mild Pain (1 - 3)  oxyCODONE  5 mG/acetaminophen 325 mG 1Tablet(s) Oral every 4 hours PRN Moderate Pain (4 - 6)  oxyCODONE  5 mG/acetaminophen 325 mG 2Tablet(s) Oral every 6 hours PRN Severe Pain (7 - 10)      LABS:                        8.1    9.3   )-----------( 476      ( 2017 06:43 )             25.4     06-    142  |  113<H>  |  48<H>  ----------------------------<  72  4.8   |  23  |  1.40<H>    Ca    7.6<L>      2017 06:43        Urinalysis Basic - ( 2017 16:16 )    Color: Yellow / Appearance: Clear / S.015 / pH: x  Gluc: x / Ketone: Negative  / Bili: Negative / Urobili: Negative   Blood: x / Protein: 75 mg/dL / Nitrite: Negative   Leuk Esterase: Moderate / RBC: 0-2 /HPF / WBC 6-10   Sq Epi: x / Non Sq Epi: Few / Bacteria: Few      CAPILLARY BLOOD GLUCOSE       @ 04:25   Rare Yeast  Rare Bacillus species not anthracis  --  --   @ 22:18   Culture grew 3 or more types of organisms which indicate  collection contamination; consider recollection only if clinically  indicated.  --  --   @ 21:46   No growth to date.  --  --   @ 21:43   No growth to date.  --  --          RADIOLOGY & ADDITIONAL TESTS:    Imaging Personally Reviewed:   no new test     Advance Directives:   full code     Palliative Care:    appropriate

## 2017-06-24 NOTE — PROGRESS NOTE ADULT - PROBLEM SELECTOR PLAN 2
Cr 1.4 from .96  likely 2/2 vanco which is d/c as well as the IV contrast  IVF increased to 75ml/hr  Nephrology consulted-Artur  will monitor Cr

## 2017-06-24 NOTE — PROGRESS NOTE ADULT - ASSESSMENT
85 y/o f pmhx depression, DM, HLD, HTN, MS, ulcer low back/pelvis presented to the ED with severe back pain admitted with sacral osteomyelitis with rt picc line .

## 2017-06-25 LAB
ANION GAP SERPL CALC-SCNC: 8 MMOL/L — SIGNIFICANT CHANGE UP (ref 5–17)
BASOPHILS # BLD AUTO: 0.2 K/UL — SIGNIFICANT CHANGE UP (ref 0–0.2)
BASOPHILS NFR BLD AUTO: 2.1 % — HIGH (ref 0–2)
BUN SERPL-MCNC: 48 MG/DL — HIGH (ref 7–23)
CALCIUM SERPL-MCNC: 7.3 MG/DL — LOW (ref 8.5–10.1)
CHLORIDE SERPL-SCNC: 112 MMOL/L — HIGH (ref 96–108)
CO2 SERPL-SCNC: 19 MMOL/L — LOW (ref 22–31)
CREAT SERPL-MCNC: 1.3 MG/DL — SIGNIFICANT CHANGE UP (ref 0.5–1.3)
CULTURE RESULTS: SIGNIFICANT CHANGE UP
CULTURE RESULTS: SIGNIFICANT CHANGE UP
EOSINOPHIL # BLD AUTO: 0.5 K/UL — SIGNIFICANT CHANGE UP (ref 0–0.5)
EOSINOPHIL NFR BLD AUTO: 5 % — SIGNIFICANT CHANGE UP (ref 0–6)
GLUCOSE SERPL-MCNC: 88 MG/DL — SIGNIFICANT CHANGE UP (ref 70–99)
HCT VFR BLD CALC: 25.4 % — LOW (ref 34.5–45)
HGB BLD-MCNC: 8.1 G/DL — LOW (ref 11.5–15.5)
LYMPHOCYTES # BLD AUTO: 0.6 K/UL — LOW (ref 1–3.3)
LYMPHOCYTES # BLD AUTO: 6.4 % — LOW (ref 13–44)
MCHC RBC-ENTMCNC: 31.8 GM/DL — LOW (ref 32–36)
MCHC RBC-ENTMCNC: 32.7 PG — SIGNIFICANT CHANGE UP (ref 27–34)
MCV RBC AUTO: 103.1 FL — HIGH (ref 80–100)
MONOCYTES # BLD AUTO: 0.1 K/UL — SIGNIFICANT CHANGE UP (ref 0–0.9)
MONOCYTES NFR BLD AUTO: 1 % — SIGNIFICANT CHANGE UP (ref 1–9)
NEUTROPHILS # BLD AUTO: 7.7 K/UL — HIGH (ref 1.8–7.4)
NEUTROPHILS NFR BLD AUTO: 85.4 % — HIGH (ref 43–77)
PLATELET # BLD AUTO: 424 K/UL — HIGH (ref 150–400)
POTASSIUM SERPL-MCNC: 4.8 MMOL/L — SIGNIFICANT CHANGE UP (ref 3.5–5.3)
POTASSIUM SERPL-SCNC: 4.8 MMOL/L — SIGNIFICANT CHANGE UP (ref 3.5–5.3)
RBC # BLD: 2.47 M/UL — LOW (ref 3.8–5.2)
RBC # FLD: 16.1 % — HIGH (ref 10.3–14.5)
SODIUM SERPL-SCNC: 139 MMOL/L — SIGNIFICANT CHANGE UP (ref 135–145)
SPECIMEN SOURCE: SIGNIFICANT CHANGE UP
SPECIMEN SOURCE: SIGNIFICANT CHANGE UP
WBC # BLD: 9 K/UL — SIGNIFICANT CHANGE UP (ref 3.8–10.5)
WBC # FLD AUTO: 9 K/UL — SIGNIFICANT CHANGE UP (ref 3.8–10.5)

## 2017-06-25 PROCEDURE — 99233 SBSQ HOSP IP/OBS HIGH 50: CPT

## 2017-06-25 RX ADMIN — Medication 100 MILLIGRAM(S): at 05:30

## 2017-06-25 RX ADMIN — Medication 1 DROP(S): at 12:19

## 2017-06-25 RX ADMIN — Medication 100 MILLIGRAM(S): at 18:27

## 2017-06-25 RX ADMIN — Medication 650 MILLIGRAM(S): at 12:00

## 2017-06-25 RX ADMIN — ENOXAPARIN SODIUM 40 MILLIGRAM(S): 100 INJECTION SUBCUTANEOUS at 12:18

## 2017-06-25 RX ADMIN — SODIUM CHLORIDE 75 MILLILITER(S): 9 INJECTION INTRAMUSCULAR; INTRAVENOUS; SUBCUTANEOUS at 12:40

## 2017-06-25 RX ADMIN — CEFEPIME 100 MILLIGRAM(S): 1 INJECTION, POWDER, FOR SOLUTION INTRAMUSCULAR; INTRAVENOUS at 18:27

## 2017-06-25 RX ADMIN — Medication 50 MILLIGRAM(S): at 05:30

## 2017-06-25 RX ADMIN — Medication 650 MILLIGRAM(S): at 11:01

## 2017-06-25 RX ADMIN — Medication 75 MILLIGRAM(S): at 21:36

## 2017-06-25 RX ADMIN — POLYETHYLENE GLYCOL 3350 17 GRAM(S): 17 POWDER, FOR SOLUTION ORAL at 20:12

## 2017-06-25 RX ADMIN — CEFEPIME 100 MILLIGRAM(S): 1 INJECTION, POWDER, FOR SOLUTION INTRAMUSCULAR; INTRAVENOUS at 05:30

## 2017-06-25 NOTE — PROGRESS NOTE ADULT - SUBJECTIVE AND OBJECTIVE BOX
NEPHROLOGY INTERVAL HPI/OVERNIGHT EVENTS:  HPI:  85 y/o f pmhx depression, DM, HLD, HTN, MS, ulcer low back/pelvis presented to the ED with severe back pain. Pt is lethargic and not able to speak,  at bedside.   awoke at midnight c/o back ache,  gave Xanax to pt and she was able to sleep the remainder of the night. When she woke up in the morning she began to complain of the pain again and was unable to find a comfortable position. He gave her another Xanax but this did not seem to help the pain this time.  states she has a "very bad bed wound" since 2016. She has been seen by wound care and has had debridement of the wounds. She has also been in several nursing facilities until recently, pt was on hospice until 1 month ago. Pt. did not want to answer any questions at time of exam. History obtained from .    Renal consulted for DERECK. +Zosyn and vanco exposure. Vanco level 20. No urinry c/o. +Canseco. +IV contrast exposure 2 days prior to DERECK. No hypotensive episode. No rash. No hematuria. No hemoptysis.     F/u DERECK      PAST MEDICAL & SURGICAL HISTORY:  Open wound of lower back and pelvis  Depression  Hypercholesteremia  Hypertension  Diabetes mellitus  MS (multiple sclerosis)  S/P ORIF (open reduction internal fixation) fracture: right lower leg  H/O oophorectomy  H/O abdominal hysterectomy      MEDICATIONS  (STANDING):  venlafaxine 75milliGRAM(s) Oral at bedtime  polyethylene glycol 3350 17Gram(s) Oral every 24 hours  enoxaparin Injectable 40milliGRAM(s) SubCutaneous daily  metoprolol succinate ER 50milliGRAM(s) Oral daily  cefepime  IVPB  IV Intermittent   cefepime  IVPB 2000milliGRAM(s) IV Intermittent every 12 hours  docusate sodium 100milliGRAM(s) Oral two times a day  sodium chloride 0.9%. 1000milliLiter(s) IV Continuous <Continuous>    MEDICATIONS  (PRN):  senna 2Tablet(s) Oral at bedtime PRN Constipation  acetaminophen   Tablet. 650milliGRAM(s) Oral every 6 hours PRN Mild Pain (1 - 3)  oxyCODONE  5 mG/acetaminophen 325 mG 1Tablet(s) Oral every 4 hours PRN Moderate Pain (4 - 6)  oxyCODONE  5 mG/acetaminophen 325 mG 2Tablet(s) Oral every 6 hours PRN Severe Pain (7 - 10)      Allergies    No Known Allergies    Intolerances        Vital Signs Last 24 Hrs  T(C): 36.4, Max: 36.8 (- @ 21:51)  T(F): 97.5, Max: 98.2 ( @ 21:51)  HR: 83 (76 - 83)  BP: 145/74 (129/79 - 145/74)  BP(mean): --  RR: 18 (16 - 18)  SpO2: 99% (99% - 100%)  Daily     Daily     PHYSICAL EXAM:  GENERAL: NAD  HEAD:  Atraumatic, Normocephalic  EYES: EOMI, PERRLA, conjunctiva and sclera clear  ENMT: No tonsillar erythema, exudates, or enlargement; Moist mucous membranes, Good dentition, No lesions  NERVOUS SYSTEM:  Alert & Oriented X3,  CHEST/LUNG: Clear to percussion bilaterally; No rales, rhonchi, wheezing, or rubs  HEART: Regular rate and rhythm; No murmurs, rubs, or gallops  ABDOMEN: Soft, Nontender, Nondistended; Bowel sounds present, body wall and flank edema  EXTREMITIES:  No edema   LYMPH: No lymphadenopathy noted  SKIN: No rashes or lesions, pale    LABS:                        8.1    9.0   )-----------( 424      ( 2017 06:50 )             25.4     06-25    139  |  112<H>  |  48<H>  ----------------------------<  88  4.8   |  19<L>  |  1.30    Ca    7.3<L>      2017 06:50        Urinalysis Basic - ( 2017 16:16 )    Color: Yellow / Appearance: Clear / S.015 / pH: x  Gluc: x / Ketone: Negative  / Bili: Negative / Urobili: Negative   Blood: x / Protein: 75 mg/dL / Nitrite: Negative   Leuk Esterase: Moderate / RBC: 0-2 /HPF / WBC 6-10   Sq Epi: x / Non Sq Epi: Few / Bacteria: Few      A/P:  DERECK - improving   - With AKIN criteria  - DERECK previous risk factors includes Vanco induced direct DERECK, Zosyn induced AIN, or contrast induced KAILASH  - Now Cr plateau, this suggest more ATN picture, and less AIN or pre-renal.   - Vanco and zosyn stopped. Continue IVF for today. No steroid indicated.   - FeNa does not suggest pure pre-renal  - Urine eosinophil negative   - No role for mucomyst 48 hours past contrast exposure  - Can resume metformin. Metformin does not cause DERECK, it rather increase the risk of type B lactic acidosis in patient with CKD/DERECK.   - Can d/c canseco     Sacral ulcer  DM  HTN    Thank you

## 2017-06-25 NOTE — PROGRESS NOTE ADULT - PROBLEM SELECTOR PLAN 2
Cr 1.3 from .96  likely 2/2 vanco which is d/c as well as the IV contrast  IVF cont  to 75ml/hr  Nephrology consulted-Artur  will monitor Cr

## 2017-06-25 NOTE — PROGRESS NOTE ADULT - SUBJECTIVE AND OBJECTIVE BOX
Patient is a 86y old  Female who presents with a chief complaint of severe back pain (2017 16:12)        · Subjective and Objective: 	  HPI:  85 y/o f pmhx depression, DM, HLD, HTN, MS, ulcer low back/pelvis presented to the ED with severe back pain. Pt has also been in several nursing facilities until recently. Pt. Deep sacral ulcer possibly extending to bone. Pt has stage IV ulcer (bone visible) - clinically osteomyelitis. Zosyn and vanco d/c after 3 days and pt was started on cefepime to continue for a course of 6 weeks for sacral osteo. Pt states pain is worse so pain meds were increased and will be monitored. Pt to go for PICC today for long term abx. mild eloy  2/2 vanco which  is now better   d/c vanco  as well as the IV contrast, IVF increased, will monitor Cr, Nephrology consulted. H/H was low possible anemia of chr dis  , pt s/p 1 unit PRBC, H/H now stable. increase oral hydration , pt c/o low appetite   also  c/o of itching  of rt eye today . cont on wound vac for sacral decubitus stage4  , chr canseco sec to hx urinary  retention sec to  hx of ms .  pt bed bound sec to hx ms as per family .    REVIEW OF SYSTEMS:    CONSTITUTIONAL: + weakness, denies fevers or chills  EYES/ENT: No visual changes ,rt  eye discomfort   RESPIRATORY: No cough, wheezing, hemoptysis; No shortness of breath  CARDIOVASCULAR: No chest pain or palpitations  GASTROINTESTINAL: No abdominal, nausea, vomiting, or hematemesis; No diarrhea or constipation. No melena or hematochezia.  GENITOURINARY: No dysuria, frequency or hematuria  NEUROLOGICAL: No dizziness, numbness, or weakness  SKIN: + moderate pain on back where bed  sore is, No itching, burning,  All other review of systems is negative unless indicated above.    Vital Signs Last 24 Hrs  T(C): 36.7, Max: 36.8 (- @ 13:46)  T(F): 98.1, Max: 98.2 (- @ 13:46)  HR: 90 (90 - 93)  BP: 125/72 (125/72 - 141/80)  BP(mean): --  RR: 16 (16 - 17)  SpO2: 99% (97% - 100%)    PHYSICAL EXAM:     GENERAL: pale, frail, cachectic female, no acute distress  HEENT: + conjunctival no redness / no swelling  NC/AT, EOMI, neck supple,  RESPIRATORY: decreased breath sounds b/l low bases , no rhonchi, rales, or wheezing  CARDIOVASCULAR: RRR, no murmurs, gallops, rubs  ABDOMINAL: soft, non-tender, distended positive bowel sounds   EXTREMITIES: + 1 pitting edema in b/l legs, no clubbing, cyanosis  NEUROLOGICAL: alert and oriented x 3, motor 4/5 low ext / sensation intact   SKIN: + wound vac in place over sacral ulcer satge4 . /rt arm picc line intact    - Chronic canseco          MEDICATIONS  (STANDING):  venlafaxine 75milliGRAM(s) Oral at bedtime  polyethylene glycol 3350 17Gram(s) Oral every 24 hours  enoxaparin Injectable 40milliGRAM(s) SubCutaneous daily  metoprolol succinate ER 50milliGRAM(s) Oral daily  cefepime  IVPB  IV Intermittent   cefepime  IVPB 2000milliGRAM(s) IV Intermittent every 12 hours  docusate sodium 100milliGRAM(s) Oral two times a day  sodium chloride 0.9%. 1000milliLiter(s) IV Continuous <Continuous>  artificial  tears Solution 1Drop(s) Both EYES daily    MEDICATIONS  (PRN):  senna 2Tablet(s) Oral at bedtime PRN Constipation  acetaminophen   Tablet. 650milliGRAM(s) Oral every 6 hours PRN Mild Pain (1 - 3)  oxyCODONE  5 mG/acetaminophen 325 mG 1Tablet(s) Oral every 4 hours PRN Moderate Pain (4 - 6)  oxyCODONE  5 mG/acetaminophen 325 mG 2Tablet(s) Oral every 6 hours PRN Severe Pain (7 - 10)      LABS:                        8.1    9.0   )-----------( 424      ( 2017 06:50 )             25.4     06-    139  |  112<H>  |  48<H>  ----------------------------<  88  4.8   |  19<L>  |  1.30    Ca    7.3<L>      2017 06:50        Urinalysis Basic - ( 2017 16:16 )    Color: Yellow / Appearance: Clear / S.015 / pH: x  Gluc: x / Ketone: Negative  / Bili: Negative / Urobili: Negative   Blood: x / Protein: 75 mg/dL / Nitrite: Negative   Leuk Esterase: Moderate / RBC: 0-2 /HPF / WBC 6-10   Sq Epi: x / Non Sq Epi: Few / Bacteria: Few      CAPILLARY BLOOD GLUCOSE      06-22 @ 04:25   Rare Yeast  Rare Bacillus species not anthracis  --  --          RADIOLOGY & ADDITIONAL TESTS:    Imaging Personally Reviewed:   no new test

## 2017-06-26 VITALS
SYSTOLIC BLOOD PRESSURE: 148 MMHG | RESPIRATION RATE: 17 BRPM | DIASTOLIC BLOOD PRESSURE: 83 MMHG | TEMPERATURE: 98 F | HEART RATE: 86 BPM | OXYGEN SATURATION: 97 %

## 2017-06-26 LAB
ANION GAP SERPL CALC-SCNC: 8 MMOL/L — SIGNIFICANT CHANGE UP (ref 5–17)
BUN SERPL-MCNC: 44 MG/DL — HIGH (ref 7–23)
CALCIUM SERPL-MCNC: 7.4 MG/DL — LOW (ref 8.5–10.1)
CHLORIDE SERPL-SCNC: 115 MMOL/L — HIGH (ref 96–108)
CO2 SERPL-SCNC: 18 MMOL/L — LOW (ref 22–31)
CREAT SERPL-MCNC: 1.1 MG/DL — SIGNIFICANT CHANGE UP (ref 0.5–1.3)
GLUCOSE SERPL-MCNC: 95 MG/DL — SIGNIFICANT CHANGE UP (ref 70–99)
POTASSIUM SERPL-MCNC: 4.7 MMOL/L — SIGNIFICANT CHANGE UP (ref 3.5–5.3)
POTASSIUM SERPL-SCNC: 4.7 MMOL/L — SIGNIFICANT CHANGE UP (ref 3.5–5.3)
SODIUM SERPL-SCNC: 141 MMOL/L — SIGNIFICANT CHANGE UP (ref 135–145)

## 2017-06-26 PROCEDURE — 83935 ASSAY OF URINE OSMOLALITY: CPT

## 2017-06-26 PROCEDURE — 80053 COMPREHEN METABOLIC PANEL: CPT

## 2017-06-26 PROCEDURE — 85730 THROMBOPLASTIN TIME PARTIAL: CPT

## 2017-06-26 PROCEDURE — 85027 COMPLETE CBC AUTOMATED: CPT

## 2017-06-26 PROCEDURE — 80048 BASIC METABOLIC PNL TOTAL CA: CPT

## 2017-06-26 PROCEDURE — 80202 ASSAY OF VANCOMYCIN: CPT

## 2017-06-26 PROCEDURE — 86900 BLOOD TYPING SEROLOGIC ABO: CPT

## 2017-06-26 PROCEDURE — 86850 RBC ANTIBODY SCREEN: CPT

## 2017-06-26 PROCEDURE — 82150 ASSAY OF AMYLASE: CPT

## 2017-06-26 PROCEDURE — 71045 X-RAY EXAM CHEST 1 VIEW: CPT

## 2017-06-26 PROCEDURE — 77001 FLUOROGUIDE FOR VEIN DEVICE: CPT

## 2017-06-26 PROCEDURE — 36569 INSJ PICC 5 YR+ W/O IMAGING: CPT

## 2017-06-26 PROCEDURE — 83605 ASSAY OF LACTIC ACID: CPT

## 2017-06-26 PROCEDURE — 85018 HEMOGLOBIN: CPT

## 2017-06-26 PROCEDURE — 82570 ASSAY OF URINE CREATININE: CPT

## 2017-06-26 PROCEDURE — 87205 SMEAR GRAM STAIN: CPT

## 2017-06-26 PROCEDURE — 74177 CT ABD & PELVIS W/CONTRAST: CPT

## 2017-06-26 PROCEDURE — 76937 US GUIDE VASCULAR ACCESS: CPT

## 2017-06-26 PROCEDURE — 85610 PROTHROMBIN TIME: CPT

## 2017-06-26 PROCEDURE — 96367 TX/PROPH/DG ADDL SEQ IV INF: CPT

## 2017-06-26 PROCEDURE — 87086 URINE CULTURE/COLONY COUNT: CPT

## 2017-06-26 PROCEDURE — 99285 EMERGENCY DEPT VISIT HI MDM: CPT | Mod: 25

## 2017-06-26 PROCEDURE — 36415 COLL VENOUS BLD VENIPUNCTURE: CPT

## 2017-06-26 PROCEDURE — 84550 ASSAY OF BLOOD/URIC ACID: CPT

## 2017-06-26 PROCEDURE — 84133 ASSAY OF URINE POTASSIUM: CPT

## 2017-06-26 PROCEDURE — 83690 ASSAY OF LIPASE: CPT

## 2017-06-26 PROCEDURE — 86920 COMPATIBILITY TEST SPIN: CPT

## 2017-06-26 PROCEDURE — C1751: CPT

## 2017-06-26 PROCEDURE — 84145 PROCALCITONIN (PCT): CPT

## 2017-06-26 PROCEDURE — 99239 HOSP IP/OBS DSCHRG MGMT >30: CPT

## 2017-06-26 PROCEDURE — 96365 THER/PROPH/DIAG IV INF INIT: CPT

## 2017-06-26 PROCEDURE — 87070 CULTURE OTHR SPECIMN AEROBIC: CPT

## 2017-06-26 PROCEDURE — 84300 ASSAY OF URINE SODIUM: CPT

## 2017-06-26 PROCEDURE — 36430 TRANSFUSION BLD/BLD COMPNT: CPT

## 2017-06-26 PROCEDURE — P9016: CPT

## 2017-06-26 PROCEDURE — 93005 ELECTROCARDIOGRAM TRACING: CPT

## 2017-06-26 PROCEDURE — 70450 CT HEAD/BRAIN W/O DYE: CPT

## 2017-06-26 PROCEDURE — 97161 PT EVAL LOW COMPLEX 20 MIN: CPT

## 2017-06-26 PROCEDURE — 81001 URINALYSIS AUTO W/SCOPE: CPT

## 2017-06-26 PROCEDURE — 87040 BLOOD CULTURE FOR BACTERIA: CPT

## 2017-06-26 PROCEDURE — 86901 BLOOD TYPING SEROLOGIC RH(D): CPT

## 2017-06-26 PROCEDURE — 83036 HEMOGLOBIN GLYCOSYLATED A1C: CPT

## 2017-06-26 RX ORDER — ENOXAPARIN SODIUM 100 MG/ML
40 INJECTION SUBCUTANEOUS
Qty: 0 | Refills: 0 | COMMUNITY
Start: 2017-06-26

## 2017-06-26 RX ORDER — OXYCODONE HYDROCHLORIDE 5 MG/1
1 TABLET ORAL
Qty: 0 | Refills: 0 | COMMUNITY
Start: 2017-06-26

## 2017-06-26 RX ORDER — FERROUS SULFATE 325(65) MG
325 TABLET ORAL DAILY
Qty: 0 | Refills: 0 | Status: DISCONTINUED | OUTPATIENT
Start: 2017-06-26 | End: 2017-06-26

## 2017-06-26 RX ORDER — DOCUSATE SODIUM 100 MG
1 CAPSULE ORAL
Qty: 0 | Refills: 0 | COMMUNITY
Start: 2017-06-26

## 2017-06-26 RX ORDER — CEFEPIME 1 G/1
2 INJECTION, POWDER, FOR SOLUTION INTRAMUSCULAR; INTRAVENOUS
Qty: 0 | Refills: 0 | COMMUNITY
Start: 2017-06-26 | End: 2017-08-02

## 2017-06-26 RX ORDER — OXYCODONE HYDROCHLORIDE 5 MG/1
2 TABLET ORAL
Qty: 0 | Refills: 0 | COMMUNITY
Start: 2017-06-26

## 2017-06-26 RX ADMIN — Medication 100 MILLIGRAM(S): at 05:39

## 2017-06-26 RX ADMIN — CEFEPIME 100 MILLIGRAM(S): 1 INJECTION, POWDER, FOR SOLUTION INTRAMUSCULAR; INTRAVENOUS at 05:39

## 2017-06-26 RX ADMIN — Medication 1 DROP(S): at 11:53

## 2017-06-26 RX ADMIN — ENOXAPARIN SODIUM 40 MILLIGRAM(S): 100 INJECTION SUBCUTANEOUS at 11:53

## 2017-06-26 RX ADMIN — Medication 50 MILLIGRAM(S): at 05:39

## 2017-06-26 NOTE — PROGRESS NOTE ADULT - PROBLEM SELECTOR PLAN 4
-Pain control as above  - chronic canseco  - PT consult - pt to go home with [facility] and home care nurse -Pain control as above  - chronic canseco  - PT consult - pt  to go jayde.

## 2017-06-26 NOTE — PROGRESS NOTE ADULT - PROBLEM SELECTOR PLAN 2
resolved  Cr 1.1 now  likely 2/2 vanco which is d/c as well as the IV contrast  IVF d/c  Nephrology consulted-Artur - agrees with plan  will monitor Cr

## 2017-06-26 NOTE — PROGRESS NOTE ADULT - SUBJECTIVE AND OBJECTIVE BOX
NEPHROLOGY INTERVAL HPI/OVERNIGHT EVENTS:  HPI:  87 y/o f pmhx depression, DM, HLD, HTN, MS, ulcer low back/pelvis presented to the ED with severe back pain. Pt is lethargic and not able to speak,  at bedside.   awoke at midnight c/o back ache,  gave Xanax to pt and she was able to sleep the remainder of the night. When she woke up in the morning she began to complain of the pain again and was unable to find a comfortable position. He gave her another Xanax but this did not seem to help the pain this time.  states she has a "very bad bed wound" since January 2016. She has been seen by wound care and has had debridement of the wounds. She has also been in several nursing facilities until recently, pt was on hospice until 1 month ago. Pt. did not want to answer any questions at time of exam. History obtained from .    In the ED: H/H: 8/25.8, BUN 47, albumin 2.8, procalcitonin .14, Lactate 1, UA: turbid, moderate LE, 11-25 WBC, pH 9, 3-5 RBC, TNTC bacteria.     CT head: No acute intracranial pathology.  CXR: no active disease  CT abdomen/pelvis: Bladder appearance consistent with cystitis in the appropriate clinical setting. Correlate with symptomatology and urinalysis. Deep sacral ulcer possibly extending to bone. Osteomyelitis not excluded. Correlate with bone scan or MR. Pt given 1x dose IV Zosyn, 1L bolus NS, Xanax, (20 Jun 2017 19:50)    PAST MEDICAL & SURGICAL HISTORY:  Open wound of lower back and pelvis  Depression  Hypercholesteremia  Hypertension  Diabetes mellitus  MS (multiple sclerosis)  S/P ORIF (open reduction internal fixation) fracture: right lower leg  H/O oophorectomy  H/O abdominal hysterectomy      MEDICATIONS  (STANDING):  venlafaxine 75milliGRAM(s) Oral at bedtime  polyethylene glycol 3350 17Gram(s) Oral every 24 hours  enoxaparin Injectable 40milliGRAM(s) SubCutaneous daily  metoprolol succinate ER 50milliGRAM(s) Oral daily  cefepime  IVPB  IV Intermittent   cefepime  IVPB 2000milliGRAM(s) IV Intermittent every 12 hours  docusate sodium 100milliGRAM(s) Oral two times a day  artificial  tears Solution 1Drop(s) Both EYES daily    MEDICATIONS  (PRN):  senna 2Tablet(s) Oral at bedtime PRN Constipation  acetaminophen   Tablet. 650milliGRAM(s) Oral every 6 hours PRN Mild Pain (1 - 3)  oxyCODONE  5 mG/acetaminophen 325 mG 1Tablet(s) Oral every 4 hours PRN Moderate Pain (4 - 6)  oxyCODONE  5 mG/acetaminophen 325 mG 2Tablet(s) Oral every 6 hours PRN Severe Pain (7 - 10)      Allergies No Known Allergies  Intolerances    Vital Signs Last 24 Hrs  T(C): 36.8, Max: 36.8 (06-26 @ 05:09)  T(F): 98.3, Max: 98.3 (06-26 @ 05:09)  HR: 88 (80 - 88)  BP: 147/64 (138/74 - 147/64)  BP(mean): --  RR: 17 (16 - 17)  SpO2: 97% (95% - 100%)  Daily     Daily     PHYSICAL EXAM:    GENERAL: NAD, well-groomed, well-developed  HEAD:  Atraumatic, Normocephalic  EYES: EOMI, PERRLA, conjunctiva and sclera clear  ENMT: No tonsillar erythema, exudates, or enlargement; Moist mucous membranes, Good dentition, No lesions  NECK: Supple, No JVD, Normal thyroid  NERVOUS SYSTEM:  Alert & Oriented X3, Good concentration; Motor Strength 5/5 B/L upper and lower extremities; DTRs 2+ intact and symmetric  CHEST/LUNG: Clear to percussion bilaterally; No rales, rhonchi, wheezing, or rubs  HEART: Regular rate and rhythm; No murmurs, rubs, or gallops  ABDOMEN: Soft, Nontender, Nondistended; Bowel sounds present  EXTREMITIES:  2+ Peripheral Pulses, No clubbing, cyanosis, or edema  SKIN: No rashes or lesions    LABS:             8.1    9.0   )-----------( 424      ( 25 Jun 2017 06:50 )             25.4     06-26    141  |  115<H>  |  44<H>  ----------------------------<  95  4.7   |  18<L>  |  1.10    Ca    7.4<L>      26 Jun 2017 07:14      RADIOLOGY & ADDITIONAL TESTS:

## 2017-06-26 NOTE — PROGRESS NOTE ADULT - ATTENDING COMMENTS
pt seen/examine see above plan and management . pt  have eloy sec to abx induce vs iv contrast   now better / avoid nephrotoxic agent in future . anemia of chr dis stable hb post transfusion. hx dm on diet /fu hba1c. pt need home with home care  with picc line abx for acute om and wound vac for stage 4  sacral decubitus . chr canseco with hx urinary retention sec to hx of ms .fu electrolyte in am .
pt seen/examine see above plan and management . pt  have eloy sec to abx induce vs iv contrast   now resolved / avoid nephrotoxic agent in future . anemia of chr dis stable hb post transfusion. hx dm on diet /fu hba1c add insulin as needed . dc plan as per family jayde   with picc line abx for acute om and wound vac for stage 4  sacral decubitus . chr canseco with hx urinary retention sec to hx of ms . pmd dr harley notified dc plan .
pt seen/examine see above plan and management . pt  have eloy sec to abx induce vs iv contrast  cont iv fluid / avoid nephrotoxic agent / fu bmp. anemia of chr dis stable hb post transfusion. hx dm on diet /fu hba1c. pt need rehab with picc line abx and wound vac for stage 4  sacral decubitus /  acute om.

## 2017-06-26 NOTE — PROGRESS NOTE ADULT - ASSESSMENT
A/P:  DERECK   - multifactorial  - improving : cr dropped from 1.4 to 1.1, baseline best cr is 0.96  - With AKIN criteria  - DERECK previous risk factors includes Vanco induced direct DERECK, Zosyn induced AIN, or contrast induced KAILASH  - Now Cr plateau, this suggest more ATN picture, and less AIN or pre-renal.   - Vanco and zosyn stopped. Continue IVF for today. No steroid indicated.   - FeNa does not suggest pure pre-renal  - Urine eosinophil negative   - No role for mucomyst 48 hours past contrast exposure  - Can resume metformin. Metformin does not cause DERECK, it rather increase the risk of type B lactic acidosis in patient with CKD/DERECK.   - Can d/c canseco     Sacral ulcer  DM  HTN    Thank you

## 2017-06-26 NOTE — PROGRESS NOTE ADULT - PROBLEM SELECTOR PLAN 10
-Continue Lovenox for DVT prophylaxis

## 2017-06-26 NOTE — PROGRESS NOTE ADULT - PROBLEM SELECTOR PROBLEM 2
Cystitis
DERECK (acute kidney injury)
MS (multiple sclerosis)
MS (multiple sclerosis)

## 2017-06-26 NOTE — PROGRESS NOTE ADULT - SUBJECTIVE AND OBJECTIVE BOX
Patient is a 86y old  Female who presents with a chief complaint of severe back pain (2017 16:12)    · Subjective and Objective: 	  HPI:  87 y/o f pmhx depression, DM, HLD, HTN, MS, ulcer low back/pelvis presented to the ED with severe back pain. Pt has also been in several nursing facilities until recently. Pt. Deep sacral ulcer possibly extending to bone. Pt has stage IV ulcer (bone visible) - clinically osteomyelitis. Zosyn and vanco d/c after 3 days and pt was started on cefepime to continue for a course of 6 weeks for sacral osteo, PICC Line was placed.  Pt had eloy  2/2 vanco which  is now resolved. H/H was low possible anemia of chr dis, pt s/p 1 unit PRBC, H/H now stable. Pt has decreased appetite - Glucerna was added and oral hydration was increased. Pt has chr canseco sec to hx urinary  retention sec to  hx of ms .  pt bed bound sec to hx ms as per family .    REVIEW OF SYSTEMS:    CONSTITUTIONAL: + weakness, denies fevers or chills  EYES/ENT: No visual changes ,rt  eye discomfort   RESPIRATORY: No cough, wheezing, hemoptysis; No shortness of breath  CARDIOVASCULAR: No chest pain or palpitations  GASTROINTESTINAL: No abdominal, nausea, vomiting, or hematemesis; No diarrhea or constipation. No melena or hematochezia.  GENITOURINARY: No dysuria, frequency or hematuria  NEUROLOGICAL: No dizziness, numbness, or weakness  SKIN: + moderate pain on back where bed  sore is, No itching, burning,  All other review of systems is negative unless indicated above.    Vital Signs Last 24 Hrs  T(C): 36.8, Max: 36.8 ( @ 05:09)  T(F): 98.3, Max: 98.3 ( @ 05:09)  HR: 88 (80 - 88)  BP: 147/64 (138/74 - 147/64)  BP(mean): --  RR: 17 (16 - 17)  SpO2: 97% (95% - 100%)    PHYSICAL EXAM:     GENERAL: pale, frail, cachectic female, no acute distress  HEENT: + conjunctival no redness / no swelling  NC/AT, EOMI, neck supple,  RESPIRATORY: decreased breath sounds b/l low bases , no rhonchi, rales, or wheezing  CARDIOVASCULAR: RRR, no murmurs, gallops, rubs  ABDOMINAL: soft, non-tender, distended positive bowel sounds   EXTREMITIES: + 1 pitting edema in b/l legs, no clubbing, cyanosis  NEUROLOGICAL: alert and oriented x 3, motor 4/5 low ext / sensation intact   SKIN: + wound vac in place over sacral ulcer satge4 . /rt arm picc line intact    - Chronic canseco    MEDICATIONS  (STANDING):  venlafaxine 75milliGRAM(s) Oral at bedtime  polyethylene glycol 3350 17Gram(s) Oral every 24 hours  enoxaparin Injectable 40milliGRAM(s) SubCutaneous daily  metoprolol succinate ER 50milliGRAM(s) Oral daily  cefepime  IVPB  IV Intermittent   cefepime  IVPB 2000milliGRAM(s) IV Intermittent every 12 hours  docusate sodium 100milliGRAM(s) Oral two times a day  sodium chloride 0.9%. 1000milliLiter(s) IV Continuous <Continuous>  artificial  tears Solution 1Drop(s) Both EYES daily    MEDICATIONS  (PRN):  senna 2Tablet(s) Oral at bedtime PRN Constipation  acetaminophen   Tablet. 650milliGRAM(s) Oral every 6 hours PRN Mild Pain (1 - 3)  oxyCODONE  5 mG/acetaminophen 325 mG 1Tablet(s) Oral every 4 hours PRN Moderate Pain (4 - 6)  oxyCODONE  5 mG/acetaminophen 325 mG 2Tablet(s) Oral every 6 hours PRN Severe Pain (7 - 10)      Labs                     8.1    9.0   )-----------( 424      ( 2017 06:50 )             25.4     06-    141  |  115<H>  |  44<H>  ----------------------------<  95  4.7   |  18<L>  |  1.10    Ca    7.4<L>      2017 07:14      Urinalysis Basic - ( 2017 16:16 )    Color: Yellow / Appearance: Clear / S.015 / pH: x  Gluc: x / Ketone: Negative  / Bili: Negative / Urobili: Negative   Blood: x / Protein: 75 mg/dL / Nitrite: Negative   Leuk Esterase: Moderate / RBC: 0-2 /HPF / WBC 6-10   Sq Epi: x / Non Sq Epi: Few / Bacteria: Few      CAPILLARY BLOOD GLUCOSE      06-22 @ 04:25   Rare Yeast  Rare Bacillus species not anthracis  --  --          RADIOLOGY & ADDITIONAL TESTS:    Imaging Personally Reviewed:   no new test Patient is a 86y old  Female who presents with a chief complaint of severe back pain (2017 16:12)    · Subjective and Objective: 	  HPI:  85 y/o f pmhx depression, DM, HLD, HTN, MS, ulcer low back/pelvis presented to the ED with severe back pain. Pt has also been in several nursing facilities until recently. Pt. Deep sacral ulcer possibly extending to bone. Pt has stage IV ulcer (bone visible) - clinically osteomyelitis. Zosyn and vanco d/c after 3 days and pt was started on cefepime to continue for a course of 6 weeks for sacral osteo, PICC Line was placed.  Pt had eloy  2/2 vanco which  is now resolved. H/H was low possible anemia of chr dis, pt s/p 1 unit PRBC, H/H now stable. Pt has decreased appetite - Glucerna was added and oral hydration was increased. Pt has chr canseco sec to hx urinary  retention sec to  hx of ms .  pt bed bound sec to hx ms as per family .    REVIEW OF SYSTEMS:    CONSTITUTIONAL: + weakness, denies fevers or chills  EYES/ENT: No visual changes  RESPIRATORY: No cough, wheezing, hemoptysis; No shortness of breath  CARDIOVASCULAR: No chest pain or palpitations  GASTROINTESTINAL: No abdominal, nausea, vomiting, or hematemesis; No diarrhea or constipation. No melena or hematochezia.  GENITOURINARY: No dysuria, frequency or hematuria  NEUROLOGICAL: No dizziness, numbness, or weakness  SKIN: + moderate pain on back where bed  sore is, No itching, burning,  All other review of systems is negative unless indicated above.    Vital Signs Last 24 Hrs  T(C): 36.8, Max: 36.8 ( @ 05:09)  T(F): 98.3, Max: 98.3 ( @ 05:09)  HR: 88 (80 - 88)  BP: 147/64 (138/74 - 147/64)  BP(mean): --  RR: 17 (16 - 17)  SpO2: 97% (95% - 100%)    PHYSICAL EXAM:     GENERAL: pale, frail, cachectic female, no acute distress  HEENT: resolved conjunctival no redness / no swelling  NC/AT, EOMI, neck supple,  RESPIRATORY: decreased breath sounds b/l low bases , no rhonchi, rales, or wheezing  CARDIOVASCULAR: RRR, no murmurs, gallops, rubs  ABDOMINAL: soft, non-tender, distended positive bowel sounds   EXTREMITIES: + 1 pitting edema in b/l legs, no clubbing, cyanosis  NEUROLOGICAL: alert and oriented x 3, motor 4/5 low ext / sensation intact   SKIN: + wound vac in place over sacral ulcer satge4 . Rt arm picc line intact    - Chronic canseco    MEDICATIONS  (STANDING):  venlafaxine 75milliGRAM(s) Oral at bedtime  polyethylene glycol 3350 17Gram(s) Oral every 24 hours  enoxaparin Injectable 40milliGRAM(s) SubCutaneous daily  metoprolol succinate ER 50milliGRAM(s) Oral daily  cefepime  IVPB  IV Intermittent   cefepime  IVPB 2000milliGRAM(s) IV Intermittent every 12 hours  docusate sodium 100milliGRAM(s) Oral two times a day  sodium chloride 0.9%. 1000milliLiter(s) IV Continuous <Continuous>  artificial  tears Solution 1Drop(s) Both EYES daily    MEDICATIONS  (PRN):  senna 2Tablet(s) Oral at bedtime PRN Constipation  acetaminophen   Tablet. 650milliGRAM(s) Oral every 6 hours PRN Mild Pain (1 - 3)  oxyCODONE  5 mG/acetaminophen 325 mG 1Tablet(s) Oral every 4 hours PRN Moderate Pain (4 - 6)  oxyCODONE  5 mG/acetaminophen 325 mG 2Tablet(s) Oral every 6 hours PRN Severe Pain (7 - 10)      Labs                     8.1    9.0   )-----------( 424      ( 2017 06:50 )             25.4     06-    141  |  115<H>  |  44<H>  ----------------------------<  95  4.7   |  18<L>  |  1.10    Ca    7.4<L>      2017 07:14      Urinalysis Basic - ( 2017 16:16 )    Color: Yellow / Appearance: Clear / S.015 / pH: x  Gluc: x / Ketone: Negative  / Bili: Negative / Urobili: Negative   Blood: x / Protein: 75 mg/dL / Nitrite: Negative   Leuk Esterase: Moderate / RBC: 0-2 /HPF / WBC 6-10   Sq Epi: x / Non Sq Epi: Few / Bacteria: Few      CAPILLARY BLOOD GLUCOSE      06-22 @ 04:25   Rare Yeast  Rare Bacillus species not anthracis  --  --          RADIOLOGY & ADDITIONAL TESTS:    Imaging Personally Reviewed:   no new test Patient is a 86y old  Female who presents with a chief complaint of severe back pain (2017 16:12)    · Subjective and Objective: 	  HPI:  85 y/o f pmhx depression, DM, HLD, HTN, MS, ulcer low back/pelvis presented to the ED with severe back pain. Pt has also been in several nursing facilities until recently. Pt. Deep sacral ulcer possibly extending to bone. Pt has stage IV ulcer (bone visible) - clinically osteomyelitis. Zosyn and vanco d/c after 3 days and pt was started on cefepime to continue for a course of 6 weeks for sacral osteo, PICC Line was placed.  Pt had eloy  2/2 vanco which  is now resolved. H/H was low possible anemia of chr dis, pt s/p 1 unit PRBC, H/H now stable. Pt has decreased appetite - Glucerna was added and oral hydration was increased. Pt has chr canseco sec to hx urinary  retention sec to  hx of ms / voiding  .  pt bed bound sec to hx ms as per family .pt denies any  cp , sob , no back pain.     REVIEW OF SYSTEMS:    CONSTITUTIONAL: + weakness, denies fevers or chills  EYES/ENT: No visual changes  RESPIRATORY: No cough, wheezing, hemoptysis; No shortness of breath  CARDIOVASCULAR: No chest pain or palpitations  GASTROINTESTINAL: No abdominal, nausea, vomiting, or hematemesis; No diarrhea or constipation. No melena or hematochezia.  GENITOURINARY: No dysuria, frequency or hematuria  NEUROLOGICAL: No dizziness, numbness, or weakness  SKIN: + mild pain  on back where bed  sore is, No itching, burning,  All other review of systems is negative unless indicated above.    Vital Signs Last 24 Hrs  T(C): 36.8, Max: 36.8 ( @ 05:09)  T(F): 98.3, Max: 98.3 ( @ 05:09)  HR: 88 (80 - 88)  BP: 147/64 (138/74 - 147/64)  BP(mean): --  RR: 17 (16 - 17)  SpO2: 97% (95% - 100%)    PHYSICAL EXAM:     GENERAL: pale, frail, weakness , no acute distress  HEENT: resolved conjunctival no redness / no swelling  NC/AT, EOMI, neck supple,  RESPIRATORY: decreased breath sounds b/l low bases , no rhonchi, rales, or wheezing  CARDIOVASCULAR: RRR, no murmurs, gallops, rubs  ABDOMINAL: soft, non-tender, distended positive bowel sounds   EXTREMITIES: + 1 pitting edema in b/l legs, no clubbing, cyanosis  NEUROLOGICAL: alert and oriented x 3, motor 4/5 low ext / sensation intact   SKIN: + wound vac in place over sacral ulcer satge4 . Rt arm picc line intact    - Chronic canseco    MEDICATIONS  (STANDING):  venlafaxine 75milliGRAM(s) Oral at bedtime  polyethylene glycol 3350 17Gram(s) Oral every 24 hours  enoxaparin Injectable 40milliGRAM(s) SubCutaneous daily  metoprolol succinate ER 50milliGRAM(s) Oral daily  cefepime  IVPB  IV Intermittent   cefepime  IVPB 2000milliGRAM(s) IV Intermittent every 12 hours  docusate sodium 100milliGRAM(s) Oral two times a day  sodium chloride 0.9%. 1000milliLiter(s) IV Continuous <Continuous>  artificial  tears Solution 1Drop(s) Both EYES daily    MEDICATIONS  (PRN):  senna 2Tablet(s) Oral at bedtime PRN Constipation  acetaminophen   Tablet. 650milliGRAM(s) Oral every 6 hours PRN Mild Pain (1 - 3)  oxyCODONE  5 mG/acetaminophen 325 mG 1Tablet(s) Oral every 4 hours PRN Moderate Pain (4 - 6)  oxyCODONE  5 mG/acetaminophen 325 mG 2Tablet(s) Oral every 6 hours PRN Severe Pain (7 - 10)      Labs                     8.1    9.0   )-----------( 424      ( 2017 06:50 )             25.4     06-    141  |  115<H>  |  44<H>  ----------------------------<  95  4.7   |  18<L>  |  1.10    Ca    7.4<L>      2017 07:14      Urinalysis Basic - ( 2017 16:16 )    Color: Yellow / Appearance: Clear / S.015 / pH: x  Gluc: x / Ketone: Negative  / Bili: Negative / Urobili: Negative   Blood: x / Protein: 75 mg/dL / Nitrite: Negative   Leuk Esterase: Moderate / RBC: 0-2 /HPF / WBC 6-10   Sq Epi: x / Non Sq Epi: Few / Bacteria: Few      CAPILLARY BLOOD GLUCOSE      06-22 @ 04:25   Rare Yeast  Rare Bacillus species not anthracis  --  --          RADIOLOGY & ADDITIONAL TESTS:    Imaging Personally Reviewed:   no new test

## 2017-06-27 LAB — CULTURE RESULTS: SIGNIFICANT CHANGE UP

## 2017-06-28 DIAGNOSIS — L89.154 PRESSURE ULCER OF SACRAL REGION, STAGE 4: ICD-10-CM

## 2017-06-28 DIAGNOSIS — E44.0 MODERATE PROTEIN-CALORIE MALNUTRITION: ICD-10-CM

## 2017-06-28 DIAGNOSIS — D64.9 ANEMIA, UNSPECIFIED: ICD-10-CM

## 2017-06-28 DIAGNOSIS — Z96.0 PRESENCE OF UROGENITAL IMPLANTS: ICD-10-CM

## 2017-06-28 DIAGNOSIS — E88.09 OTHER DISORDERS OF PLASMA-PROTEIN METABOLISM, NOT ELSEWHERE CLASSIFIED: ICD-10-CM

## 2017-06-28 DIAGNOSIS — G35 MULTIPLE SCLEROSIS: ICD-10-CM

## 2017-06-28 DIAGNOSIS — M86.18 OTHER ACUTE OSTEOMYELITIS, OTHER SITE: ICD-10-CM

## 2017-06-28 DIAGNOSIS — B96.89 OTHER SPECIFIED BACTERIAL AGENTS AS THE CAUSE OF DISEASES CLASSIFIED ELSEWHERE: ICD-10-CM

## 2017-06-28 DIAGNOSIS — F32.9 MAJOR DEPRESSIVE DISORDER, SINGLE EPISODE, UNSPECIFIED: ICD-10-CM

## 2017-06-28 DIAGNOSIS — Z74.01 BED CONFINEMENT STATUS: ICD-10-CM

## 2019-09-22 NOTE — ED PROVIDER NOTE - CONDUCTED A DETAILED DISCUSSION WITH PATIENT AND/OR GUARDIAN REGARDING, MDM
shortness of breath/h/o PE x 4 weeks
need to admit/lab results/return to ED if symptoms worsen, persist or questions arise/radiology results

## 2020-07-27 NOTE — H&P ADULT - REASON FOR ADMISSION
Pt notified of below, states understanding.     States symptoms are overall improving.    severe back pain

## 2021-04-30 NOTE — PROGRESS NOTE ADULT - PROBLEM/PLAN-5
The labs that are already ordered include her potassium  She can get all of these done when she gets her PT INR  DISPLAY PLAN FREE TEXT

## 2021-05-27 NOTE — ED ADULT NURSE NOTE - TEMPLATE
I Still have not been contacted to send clinicals   I called [facility] and they said the case has not been assigned to a nurse yet   I should wait to here from the nurse , Back Pain

## 2021-06-12 NOTE — ED ADULT NURSE NOTE - NS ED NOTE ABUSE RESPONSE YN
Patient stated that she isn't going to PT anymore. It was pricey for her to go ($45 per visit) They closed her case and she said, Dr. Pretty said have her shot for her back not to do any physical therapy. The shot she did get didn't help. We talked about calling Dr. Pretty today or tomorrow to advise him of this and what steps he may want to take, whether that is coming back in to the clinic. Ebonie mentioned that she hasn't had any falls since we last talked. She is being careful.  A Pcam is scheduled with Ebonie SANCHEZ on 10/4.  Patient states, her nephew passed recently from cancer.   
Yes

## 2023-06-17 NOTE — PROCEDURE NOTE - NSSECUREBY_VASC_A_CORE
Patient Name: Mayra Hirsch  : 1950    MRN: [id number]                              Today's Date: 2023       Admit Date: 2023    Visit Dx:     ICD-10-CM ICD-9-CM   1. Right sided weakness  R53.1 728.87   2. Dehydration  E86.0 276.51     Patient Active Problem List   Diagnosis   • Vitamin D deficiency   • Primary hypothyroidism   • Dyslipidemia   • Essential hypertension   • Type 2 diabetes mellitus without complication, with long-term current use of insulin   • Noncompliance with diabetes treatment   • ST elevation myocardial infarction (STEMI)   • Coronary artery disease involving native heart   • S/P CABG x 5   • Low left ventricular ejection fraction   • Shortness of breath   • Right sided weakness     Past Medical History:   Diagnosis Date   • Depression    • Diabetes mellitus    • Disease of thyroid gland    • Fibrocystic breast    • Hypertension    • Hyperthyroidism    • Hypothyroidism    • PONV (postoperative nausea and vomiting)    • Type 2 diabetes mellitus      Past Surgical History:   Procedure Laterality Date   • BREAST EXCISIONAL BIOPSY Right     at age 22; benign.   • CARDIAC CATHETERIZATION Left 2022    Procedure: Cardiac Catheterization/Vascular Study;  Surgeon: Joanna Marie MD;  Location: [facility] CATH INVASIVE LOCATION;  Service: Cardiology;  Laterality: Left;   • CARDIAC CATHETERIZATION N/A 2022    Procedure: Percutaneous Coronary Intervention;  Surgeon: Joanna Marie MD;  [facility]VASIVE LOC[facility];  Service: Cardiology;  Laterality: N/A;   • CARDIAC CATHETERIZATION N/A 2022    Procedure: Left ventriculography;  Surgeon: Joanna Marie MD;  [facility];  Service: Cardiology;  Laterality: N/A;   • CARDIAC CATHETERIZATION N/A 2022    Procedure: Stent MARTINEZ coronary;  Surgeon: Joanna Marie MD;  Location:  [facility] CATH INVASIVE LOCATION;  Service: Cardiology;  Laterality: N/A;   •  CARDIAC CATHETERIZATION N/A 2022    Procedure: Left Heart Cath;  Surgeon: Joanna Marie MD;  Location: [facility] CATH INVASIVE LOCATION;  Service: Cardiology;  Laterality: N/A;   •  SECTION     • CORONARY ARTERY BYPASS GRAFT N/A 11/10/2022    Procedure: NIKKY, CORONARY ARTERY BYPASS GRAFTING TIMES 6 WITH LEFT JORDYN AND ENDOSCOPICALLY HARVESTED LEFT AND RIGHT GREATER SAPHENOUS VEIN, PRP TRANSESOPHAGEAL ECHOCARDIOGRAM WITH ANESTHESIA;  Surgeon: Jr Dong Isabel MD;  Location: [facility] CVOR;  Service: Cardiothoracic;  Laterality: N/A;   • HAND SURGERY     • KNEE SURGERY     • OOPHORECTOMY      1 ovary removed due to ectopic pregnancy      General Information     Row Name 23 1152          Physical Therapy Time and Intention    Document Type evaluation  -CB     Mode of Treatment physical therapy  -CB     Row Name 23 1152          General Information    Patient Profile Reviewed yes  -CB     Prior Level of Function independent:;gait;transfer;bed mobility  -CB     Existing Precautions/Restrictions fall  -CB     Barriers to Rehab none identified  -CB     Row Name 23 1152          Living Environment    People in Home alone  -CB     Row Name 23 1152          Home Main Entrance    Number of Stairs, Main Entrance none  -CB     Row Name 23 1152          Cognition    Orientation Status (Cognition) oriented x 3  -CB     Row Name 23 1152          Safety Issues, Functional Mobility    Impairments Affecting Function (Mobility) balance;endurance/activity tolerance;strength;grasp  -CB           User Key  (r) = Recorded By, (t) = Taken By, (c) = Cosigned By    Initials Name Provider Type    CB Jaz Billy PT Physical Therapist               Mobility     Row Name 23 1152          Bed Mobility    Bed Mobility supine-sit  -CB     Supine-Sit [place] (Bed Mobility) modified independence  -CB     Row Name 23 1152          Sit-Stand Transfer    Sit-Stand [place]  (Transfers) supervision  -CB     Assistive Device (Sit-Stand Transfers) --  no AD  -CB     Row Name 06/17/23 1152          Gait/Stairs (Locomotion)    [place] Level (Gait) contact guard  -CB     Assistive Device (Gait) --  no AD  -CB     Distance in Feet (Gait) 160ft  -CB     Deviations/Abnormal Patterns (Gait) gait speed decreased;antalgic;stride length decreased;abelardo decreased  -CB     Comment, (Gait/Stairs) no overt LOB but minimal unsteadiness noted  -CB           User Key  (r) = Recorded By, (t) = Taken By, (c) = Cosigned By    Initials Name Provider Type    CB Jaz Billy, PT Physical Therapist               Obj/Interventions     Row Name 06/17/23 1154          Range of Motion Comprehensive    General Range of Motion bilateral lower extremity ROM WFL  -CB     Row Name 06/17/23 1154          Strength Comprehensive (MMT)    Comment, General Manual Muscle Testing (MMT) Assessment LLE 5/5; RLE DF and knee ext 4+/5, R hip flexion 3+/5 and noted decreased  strength on the R  -CB     Row Name 06/17/23 1154          Motor Skills    Motor Skills coordination  -CB     Coordination WFL  -CB     Row Name 06/17/23 1154          Balance    Balance Assessment standing static balance;standing dynamic balance  -CB     Static Standing Balance supervision;standby assist  -CB     Dynamic Standing Balance contact guard  -CB     Position/Device Used, Standing Balance unsupported  -CB     Balance Interventions sitting;standing;sit to stand;supported;static;dynamic;minimal challenge  -CB     Row Name 06/17/23 1154          Sensory Assessment (Somatosensory)    Sensory Assessment (Somatosensory) sensation intact  tingling at site in LLE where vein was taken for CAGB  -CB           User Key  (r) = Recorded By, (t) = Taken By, (c) = Cosigned By    Initials Name Provider Type    CB Jaz Billy, PT Physical Therapist               Goals/Plan     Row Name 06/17/23 1156          Transfer Goal 1 (PT)    Activity/Assistive  Device (Transfer Goal 1, PT) sit-to-stand/stand-to-sit;bed-to-chair/chair-to-bed  -CB     [place] Level/Cues Needed (Transfer Goal 1, PT) modified independence  -CB     Time Frame (Transfer Goal 1, PT) long term goal (LTG);1 week  -CB     Row Name 06/17/23 1156          Gait Training Goal 1 (PT)    Activity/Assistive Device (Gait Training Goal 1, PT) gait (walking locomotion);improve balance and speed;diminish gait deviation  -CB     [place] Level (Gait Training Goal 1, PT) modified independence  -CB     Distance (Gait Training Goal 1, PT) 200ft  -CB     Time Frame (Gait Training Goal 1, PT) long term goal (LTG);1 week  -CB     Row Name 06/17/23 1151          Therapy Assessment/Plan (PT)    Planned Therapy Interventions (PT) balance training;gait training;home exercise program;patient/family education;strengthening;transfer training  -CB           User Key  (r) = Recorded By, (t) = Taken By, (c) = Cosigned By    Initials Name Provider Type    Jaz Allen, PT Physical Therapist               Clinical Impression     Row Name 06/17/23 1152          Pain    Pretreatment Pain Rating 0/10 - no pain  -CB     Row Name 06/17/23 8881          Plan of Care Review    Plan of Care Reviewed With patient  -CB     Outcome Evaluation Patient is a 72 y.o. female admitted to [facility] for R sided weakness and dehydration on 6/16/2023. MRI (-) for acute infarct. PMHx includes DM, HTN, CABG, CAD. Patient is ind at baseline and lives alone. Today, patient performed bed mobility with Margarita, required S for transfers, and ambulated 160ft without AD requiring CGA. Balance, strength, activity tolerance deficits noted. Decreased hip flexion strength on R compared to L, decreased R  strength. Patient may benefit from skilled PT services to address functional deficits and improve level of independence prior to discharge. Anticipate home with assist and OP PT upon DC.  -CB     Row Name 06/17/23 5094          Therapy Assessment/Plan  (PT)    Rehab Potential (PT) good, to achieve stated therapy goals  -CB     Criteria for Skilled Interventions Met (PT) yes  -CB     Therapy Frequency (PT) 5 times/wk  -CB     Row Name 06/17/23 1156          Positioning and Restraints    Pre-Treatment Position in bed  -CB     Post Treatment Position chair  -CB     In Chair notified nsg;reclined;call light within reach;encouraged to call for assist;exit alarm on;legs elevated  -CB           User Key  (r) = Recorded By, (t) = Taken By, (c) = Cosigned By    Initials Name Provider Type    Jaz Allen, PT Physical Therapist               Outcome Measures     Row Name 06/17/23 1157          How much help from another person do you currently need...    Turning from your back to your side while in flat bed without using bedrails? 4  -CB     Moving from lying on back to sitting on the side of a flat bed without bedrails? 4  -CB     Moving to and from a bed to a chair (including a wheelchair)? 3  -CB     Standing up from a chair using your arms (e.g., wheelchair, bedside chair)? 3  -CB     Climbing 3-5 steps with a railing? 3  -CB     To walk in hospital room? 3  -CB     AM-PAC 6 Clicks Score (PT) 20  -CB     Highest level of mobility 6 --> Walked 10 steps or more  -CB     Row Name 06/17/23 1157          Modified Bakari Scale    Modified [place] Scale 3 - Moderate disability.  Requiring some help, but able to walk without assistance.  -CB     Row Name 06/17/23 1157          Functional Assessment    Outcome Measure Options AM-PAC 6 Clicks Basic Mobility (PT);Modified [place]  -CB           User Key  (r) = Recorded By, (t) = Taken By, (c) = Cosigned By    Initials Name Provider Type    Jaz Allen, PT Physical Therapist                             Physical Therapy Education     Title: PT OT SLP Therapies (In Progress)     Topic: Physical Therapy (In Progress)     Point: Mobility training (Done)     Learning Progress Summary           Patient Acceptance, E,TB, VU,NR by  CB at 6/17/2023 1158                   Point: Home exercise program (Not Started)     Learner Progress:  Not documented in this visit.          Point: Body mechanics (Not Started)     Learner Progress:  Not documented in this visit.          Point: Precautions (Not Started)     Learner Progress:  Not documented in this visit.                      User Key     Initials Effective Dates Name Provider Type Discipline     10/22/21 -  Jaz Billy, PT Physical Therapist PT              PT Recommendation and Plan  Planned Therapy Interventions (PT): balance training, gait training, home exercise program, patient/family education, strengthening, transfer training  Plan of Care Reviewed With: patient  Outcome Evaluation: Patient is a 72 y.o. female admitted to [facility] for R sided weakness and dehydration on 6/16/2023. MRI (-) for acute infarct. PMHx includes DM, HTN, CABG, CAD. Patient is ind at baseline and lives alone. Today, patient performed bed mobility with Margarita, required S for transfers, and ambulated 160ft without AD requiring CGA. Balance, strength, activity tolerance deficits noted. Decreased hip flexion strength on R compared to L, decreased R  strength. Patient may benefit from skilled PT services to address functional deficits and improve level of independence prior to discharge. Anticipate home with assist and OP PT upon DC.     Time Calculation:    PT Charges     Row Name 06/17/23 1201             Time Calculation    Start Time 1048  -CB      Stop Time 1105  -CB      Time Calculation (min) 17 min  -CB      PT Received On 06/17/23  -CB      PT - Next Appointment 06/19/23  -CB      PT Goal Re-Cert Due Date 06/24/23  -CB         Time Calculation- PT    Total Timed Code Minutes- PT 8 minute(s)  -CB         Timed Charges    45897 - PT Therapeutic Activity Minutes 8  -CB         Total Minutes    Timed Charges Total Minutes 8  -CB       Total Minutes 8  -CB            User Key  (r) = Recorded By, (t) = Taken By,  (c) = Cosigned By    Initials Name Provider Type    CB Jaz Billy, PT Physical Therapist              Therapy Charges for Today     Code Description Service Date Service Provider Modifiers Qty    [id number]  PT THERAPEUTIC ACT EA 15 MIN 6/17/2023 Jaz Billy, PT GP 1    [id number]  PT EVAL MOD COMPLEXITY 3 6/17/2023 Jaz Billy, PT GP 1          PT G-Codes  Outcome Measure Options: AM-PAC 6 Clicks Basic Mobility (PT), Modified Bakari  AM-PAC 6 Clicks Score (PT): 20  Modified Bak[place] Scale: 3 - Moderate disability.  Requiring some help, but able to walk without assistance.  PT Discharge Summary  Anticipated Discharge Disposition (PT): home with outpatient therapy services, home with assist    Jaz Billy PT  6/17/2023     stabilization device